# Patient Record
Sex: MALE | Race: WHITE | NOT HISPANIC OR LATINO | Employment: FULL TIME | URBAN - METROPOLITAN AREA
[De-identification: names, ages, dates, MRNs, and addresses within clinical notes are randomized per-mention and may not be internally consistent; named-entity substitution may affect disease eponyms.]

---

## 2017-03-30 ENCOUNTER — ALLSCRIPTS OFFICE VISIT (OUTPATIENT)
Dept: OTHER | Facility: OTHER | Age: 47
End: 2017-03-30

## 2018-01-15 NOTE — PROCEDURES
Procedures by David Argueta MD at 2016  11:41 AM      Author:  David Argueta MD Service:  Neurology Author Type:  Physician     Filed:  2016 11:52 AM Date of Service:  2016 11:41 AM Status:  Signed     :  David Argueta MD (Physician)            ELECTROENCEPHALOGRAM (EEG)      Patient Name:  Dominique Escalante  MRN: [id number]   :  1970 File #: [id number]   Age: 39 y o  Encounter #: [id number]   Date performed: 2016            Report date: 2016          Study type: Greater than 3 hour video EEG    ICD 10 diagnosis: Other Epilepsy G40 909    Start time: 07:50 End time: 10:48     -------------------------------------------------------------------------------------------------------------------   Patient History: This recording was observed in a 39 y o  male with history of epilepsy to  determine risk of recurrent seizure  Medications include: Oxcarbazepine    -------------------------------------------------------------------------------------------------------------------   Description of Procedure:  ·  32 channel digital recording with electrodes placed according to the International 10-20 system with additional  T1/T2 electrodes, EOG, EKG, and simultaneous  video  The recording was technically satisfactory  -------------------------------------------------------------------------------------------------------------------   EEG Description:    The recording was performed with the patient awake, drowsy, and asleep  He was fully oriented  During wakefulness, there were long runs of well regulated, low amplitude, posteriorly  dominant, symmetric 10 cps alpha rhythm that attenuated with eye opening  There were symmetric low amplitude, frontally dominant beta activities  There were intermittent, left temporal delta activities  With drowsiness, alpha activity attenuated and was replaced by diffusely distributed theta activities   With sleep, symmetric vertex sharp waves and poorly  formed sleep spindles were present      -------------------------------------------------------------------------------------------------------------------   Activation Procedures:  Hyperventilation was performed for 3-4  minutes and induced mild symmetric buildup  Stepped photic stimulation between 1-20 cps was performed and induced symmetric  photic driving  Other findings: The single lead ECG demonstrated a regular rhythm     -------------------------------------------------------------------------------------------------------------------   EEG Interpretation: This Routine EEG recorded during wakefulness, drowsiness, and sleep is abnormal  Intermittent,  subtle, left temporal delta waves raise the possibility of left temporal neuronal dysfunction  No inter-ictal epileptiform discharges were seen  Jackson Evans MD   [id number] [facility]               Received for:Scott Hall Meckel M D    Apr 7 2016 11:52AM [facility] Standard Time

## 2018-04-16 DIAGNOSIS — G40.919 INTRACTABLE EPILEPSY WITHOUT STATUS EPILEPTICUS, UNSPECIFIED EPILEPSY TYPE (HCC): Primary | ICD-10-CM

## 2018-04-16 RX ORDER — OXCARBAZEPINE 300 MG/1
600 TABLET, FILM COATED ORAL 2 TIMES DAILY
Qty: 360 TABLET | Refills: 3 | Status: SHIPPED | OUTPATIENT
Start: 2018-04-16 | End: 2018-07-13 | Stop reason: SDUPTHER

## 2018-04-16 RX ORDER — OXCARBAZEPINE 300 MG/1
2 TABLET, FILM COATED ORAL 2 TIMES DAILY
COMMUNITY
End: 2018-04-16 | Stop reason: SDUPTHER

## 2018-04-16 NOTE — TELEPHONE ENCOUNTER
Pt called to request refill of oxcarb and to also inform you that he has appt w/ you 4/26 and needs letter from you as part of his  sz waiver packet  States this has been done in the past but did not want to catch "you off guard at appt " Pt understands letter cannot be completed at time of visit

## 2018-04-20 ENCOUNTER — TELEPHONE (OUTPATIENT)
Dept: NEUROLOGY | Facility: CLINIC | Age: 48
End: 2018-04-20

## 2018-04-20 NOTE — TELEPHONE ENCOUNTER
Patient is asking for a letter to clear him to get his 's license back  He had to be episode free for 4 years and his 4 year maureen is the day of his appointment next week that was cancelled  Isn't able to get a sooner appointment than July  Patient states the waiver process can take a while after the letter is sent in  Patient is anxious to get back to work  Are you willing to write a letter of clearance? Please advise        [phone]

## 2018-04-20 NOTE — TELEPHONE ENCOUNTER
Yes     Please generate in a way that I can sign it electronically remotely  Am having trouble figuring out how to do that for a letter that Asher Rankin created for me for another pt

## 2018-04-23 ENCOUNTER — TELEPHONE (OUTPATIENT)
Dept: NEUROLOGY | Facility: CLINIC | Age: 48
End: 2018-04-23

## 2018-04-23 NOTE — TELEPHONE ENCOUNTER
Letter generated  With EPIC there is no way to electronically sign  It can be either sent as is or has to be manually signed  Please advise

## 2018-04-23 NOTE — TELEPHONE ENCOUNTER
Patient picked up letter from Dr Torsten Martinez showed current [place] DL   Scanned ID and the letter from Dr Torsten Martinez into patient chart in the Document section

## 2018-04-23 NOTE — TELEPHONE ENCOUNTER
I was going to say send it as is, but after reading it, I decided to edit it a bit to indicate that the patient had had only one seizure 4 years ago and has had none since  I also corrected my name, which is "[place]", not "Ric Doctor"  I did the editing by going into the communications section of his chart  I then tried to send the edited letter to the patient, but got an error window saying "the print option is not accessible"  I couldn't sign or approve the edited version of the letter, so I pended it  I don't know how to proceed from here  You can either send the edited version to the patient either electronically or print it and mail it, which ever is easier or even possible  If done today I can manually sign the printed letter (I'm at [facility]), but I will be on PTO for 2 weeks starting tomorrow

## 2018-07-13 ENCOUNTER — OFFICE VISIT (OUTPATIENT)
Dept: NEUROLOGY | Facility: CLINIC | Age: 48
End: 2018-07-13
Payer: COMMERCIAL

## 2018-07-13 VITALS
HEART RATE: 78 BPM | WEIGHT: 220.5 LBS | DIASTOLIC BLOOD PRESSURE: 76 MMHG | BODY MASS INDEX: 31.64 KG/M2 | SYSTOLIC BLOOD PRESSURE: 124 MMHG

## 2018-07-13 DIAGNOSIS — G40.209 COMPLEX PARTIAL SEIZURE EVOLVING TO GENERALIZED SEIZURE (HCC): Primary | ICD-10-CM

## 2018-07-13 PROCEDURE — 99213 OFFICE O/P EST LOW 20 MIN: CPT | Performed by: PSYCHIATRY & NEUROLOGY

## 2018-07-13 RX ORDER — OXCARBAZEPINE 300 MG/1
TABLET, FILM COATED ORAL
Qty: 360 TABLET | Refills: 3 | Status: SHIPPED | OUTPATIENT
Start: 2018-07-13 | End: 2019-03-29 | Stop reason: SDUPTHER

## 2018-07-13 NOTE — PROGRESS NOTES
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 [employer]  He has decided to continue to take oxcarbazepine to reduce chances of seizure recurrence though we had discussed in past if at any point he wanted to consider coming off his oxcarbazepine, a repeat EEG will be done to see if there are any remaining abnormalities and if not, he would have at least a 90% chance of coming off his medication without seizures recurring  Patient is still curious what caused his seizure  MRI was normal and he asked if a repeat MRI could catch something that was not evident on that first MRI  There is some basis for that since his last EEG showed some left temporal slowing  We will consider the possibility of repeat MRI when I see him back next year  The use of oxcarbazepine does not have long term side effects  I did discuss with him that it can cause hyponatremia which can be problematic in the summer when the heat causes more perspiration and sodium loss  He assured me he drinks more Gatorade and not water and eats salt liberally  As far as determining the cause for his seizure, patient told me 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 and he has reported to me in the past that 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 by the baseball  I will see him back in one year for 30 minutes      Subjective:      HPI    16 month follow-up for a 52year old right-handed male who had his one and only seizure on 4/25/2014  When I saw the patient on 4/28, we were going to discuss possibly taking him off seizure medication, however his EEG showed left temporal slowing, which, although not epileptogenic, suggested a persistent left temporal abnormality, and a somewhat higher risk of seizure recurrence should his medication be discontinued  Before his seizure the patient was a  for [employer]  He was able to keep his job, but was not allowed to drive  The 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, and, therefore, has chosen to remain on seizure medication as to not risk seizure recurrence  When I saw him on 3/30/17, he was doing well and had not had any seizures  He also denied any side effects from his oxcarbazepine  Last seen on 3/30/17    INTERVAL HISTORY:    Current AED:  Oxcarbazepine 300 mg tablet, 2 tabs BID    Patient reports that he has not had any seizures since his last visit  Denies any side effects on his medications  He asked about possible side effects on oxcarbazepine and I told him the most common is hyponatremia but he reports that he has not experienced any dizziness and he eats enough salt and limits his water intake  At this time, he would like to remain on his medications and he asked if he would need periodic MRIs or EEGs  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  Every two years he will need his card renewed  The following portions of the patient's history were reviewed and updated as appropriate: allergies, current medications, past family history, past medical history, past social history, past surgical history and problem list          Objective:    Blood pressure 124/76, pulse 78, weight 100 kg (220 lb 8 oz)  Physical Exam   Constitutional: He appears well-developed and well-nourished  HENT:   Head: Normocephalic and atraumatic  Eyes: EOM are normal  Pupils are equal, round, and reactive to light     Neck: Neck supple  Cardiovascular: Normal rate and regular rhythm  Pulmonary/Chest: Effort normal    Musculoskeletal: Normal range of motion  Neurological: He is alert  He has normal strength  Gait and coordination normal    Psychiatric: His speech is normal    Vitals reviewed  Neurological Exam    Mental Status  The patient is alert and oriented to person, place, time, and situation  His recent and remote memory are normal  His speech is normal  His language is fluent with no aphasia  He has normal attention span and concentration  He has a normal fund of knowledge  Cranial Nerves    CN II: The patient's visual acuity and visual fields are normal   CN III, IV, VI: The patient's pupils are equally round and reactive to light and ocular movements are normal   CN V: The patient has normal facial sensation  CN VII:  The patient has symmetric facial movement  CN VIII:  The patient's hearing is normal   CN IX, X: The patient has symmetric palate movement and normal gag reflex  CN XI: The patient's shoulder shrug strength is normal   CN XII: The patient's tongue is midline without atrophy or fasciculations  Motor   His strength is 5/5 throughout all four extremities  Gait and Coordination  The patient has normal gait and station and normal casual, toe, heel, and tandem gait  He has normal coordination bilaterally  ROS:    Review of Systems   Constitutional: Negative  HENT: Negative  Eyes: Negative  Respiratory: Negative  Cardiovascular: Negative  Gastrointestinal: Negative  Endocrine: Negative  Genitourinary: Negative  Musculoskeletal: Negative  Skin: Negative  Allergic/Immunologic: Negative  Neurological: Negative  Hematological: Negative  Psychiatric/Behavioral: Negative  Counseling Documentation:  Time in: 9:05, Time out: 9:20  Total time encounter was 15 minutes and 13 minutes were spent counseling the patient        Attestation:   By signing my name below, I, Rhea Velasco, attest that this documentation has been prepared under the direction and in the presence of Claude Creed, MD  Electronically Signed: Komal Rubio  04/10/19     I, Claude Creed, personally performed the services described in this documentation  All medical record entries made by the ilyaibrory were at my direction and in my presence  I have reviewed the chart and discharge instructions and agree that the record reflects my personal performance and is accurate and complete    Claude Creed, MD  04/10/19

## 2018-07-13 NOTE — LETTER
July 16, 2018     DO Jennifer So    Patient: Azeb Tsang   YOB: 1970   Date of Visit: 7/13/2018       Dear Dr John Camejo: Thank you for referring Azeb Tsang to me for evaluation  Below are my notes for this consultation  If you have questions, please do not hesitate to call me  I look forward to following your patient along with you  Sincerely,        Rusty Singh MD        CC: No Recipients  Rusty Singh MD  7/16/2018  3:48 AM  Signed  Patient ID: Azeb Tsang is a 52 y o  male  Assessment/Plan:       Problem List Items Addressed This Visit     None      Visit Diagnoses     Intractable epilepsy without status epilepticus, unspecified epilepsy type ([address] )        Relevant Medications    OXcarbazepine (TRILEPTAL) 300 mg tablet            Discussion Summary:    Patient continues to do well  He has been seizure free for 4 years now and I was pleased to hear the restrictions for restoration of a 's license have been less restrictive  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 [employer]  He has decided to continue to take oxcarbazepine to reduce chances of seizure recurrence though we had discussed in past if at any point he wanted to consider coming off his oxcarbazepine, a repeat EEG will be done to see if there are any remaining abnormalities and if not, he would have at least a 90% chance of coming off his medication without seizures recurring  Patient is still curious what caused his seizure  MRI was normal and he asked if a repeat MRI could catch something that was not evident on that first MRI  There is some basis for that since his last EEG showed some left temporal slowing  We will consider the possibility of repeat MRI when I see him back next year  The use of oxcarbazepine does not have long term side effects   I did discuss with him that it can cause hyponatremia which can be problematic in the summer when the heat causes more perspiration and sodium loss  He assured me he drinks more Gatorade and not water and eats salt liberally  As far as determining the cause for his seizure, patient told me 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 and he has reported to me in the past that 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 by the baseball  I will see him back in one year for 30 minutes  Subjective:      HPI    16 month follow-up for a 52year old right-handed male who had his one and only seizure on 4/25/2014  When I saw the patient on 4/28, we were going to discuss possibly taking him off seizure medication, however his EEG showed left temporal slowing, which, although not epileptogenic, suggested a persistent left temporal abnormality, and a somewhat higher risk of seizure recurrence should his medication be discontinued  Before his seizure the patient was a  for [employer]  He was able to keep his job, but was not allowed to drive  The 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, and, therefore, has chosen to remain on seizure medication as to not risk seizure recurrence  When I saw him on 3/30/17, he was doing well and had not had any seizures  He also denied any side effects from his oxcarbazepine  Last seen on 3/30/17    INTERVAL HISTORY:    Current AED:  Oxcarbazepine 300 mg tablet, 2 tabs BID    Patient reports that he has not had any seizures since his last visit  Denies any side effects on his medications  He asked about possible side effects on oxcarbazepine and I told him the most common is hyponatremia but he reports that he has not experienced any dizziness and he eats enough salt and limits his water intake  At this time, he would like to remain on his medications and he asked if he would need periodic MRIs or EEGs       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  Every two years he will need his card renewed  The following portions of the patient's history were reviewed and updated as appropriate: allergies, current medications, past family history, past medical history, past social history, past surgical history and problem list          Objective:    Blood pressure 124/76, pulse 78, weight 100 kg (220 lb 8 oz)  Physical Exam   Constitutional: He appears well-developed and well-nourished  HENT:   Head: Normocephalic and atraumatic  Eyes: EOM are normal  Pupils are equal, round, and reactive to light  Neck: Neck supple  Cardiovascular: Normal rate and regular rhythm  Pulmonary/Chest: Effort normal    Musculoskeletal: Normal range of motion  Neurological: He is alert  He has normal strength  Gait and coordination normal    Psychiatric: His speech is normal    Vitals reviewed  Neurological Exam    Mental Status  The patient is alert and oriented to person, place, time, and situation  His recent and remote memory are normal  His speech is normal  His language is fluent with no aphasia  He has normal attention span and concentration  He has a normal fund of knowledge  Cranial Nerves    CN II: The patient's visual acuity and visual fields are normal   CN III, IV, VI: The patient's pupils are equally round and reactive to light and ocular movements are normal   CN V: The patient has normal facial sensation  CN VII:  The patient has symmetric facial movement  CN VIII:  The patient's hearing is normal   CN IX, X: The patient has symmetric palate movement and normal gag reflex  CN XI: The patient's shoulder shrug strength is normal   CN XII: The patient's tongue is midline without atrophy or fasciculations  Motor   His strength is 5/5 throughout all four extremities  Gait and Coordination  The patient has normal gait and station and normal casual, toe, heel, and tandem gait   He has normal coordination bilaterally  ROS:    Review of Systems   Constitutional: Negative  HENT: Negative  Eyes: Negative  Respiratory: Negative  Cardiovascular: Negative  Gastrointestinal: Negative  Endocrine: Negative  Genitourinary: Negative  Musculoskeletal: Negative  Skin: Negative  Allergic/Immunologic: Negative  Neurological: Negative  Hematological: Negative  Psychiatric/Behavioral: Negative  Counseling Documentation:  Time in: 9:05, Time out: 9:20  Total time encounter was 15 minutes and 13 minutes were spent counseling the patient  Attestation:   By signing my name below, Ric Solano, attest that this documentation has been prepared under the direction and in the presence of Claude Creed, MD  Electronically Signed: Komal Rubio  07/13/18     I, Claude Creed, personally performed the services described in this documentation  All medical record entries made by the ilyaibrory were at my direction and in my presence  I have reviewed the chart and discharge instructions and agree that the record reflects my personal performance and is accurate and complete    Claude Creed, MD  07/13/18

## 2018-07-16 RX ORDER — OMEGA-3 FATTY ACIDS/FISH OIL 300-1000MG
1 CAPSULE ORAL
COMMUNITY

## 2019-03-12 ENCOUNTER — DOCUMENTATION (OUTPATIENT)
Dept: NEUROLOGY | Facility: CLINIC | Age: 49
End: 2019-03-12

## 2019-03-29 DIAGNOSIS — G40.919 INTRACTABLE EPILEPSY WITHOUT STATUS EPILEPTICUS, UNSPECIFIED EPILEPSY TYPE (HCC): ICD-10-CM

## 2019-03-29 RX ORDER — OXCARBAZEPINE 300 MG/1
TABLET, FILM COATED ORAL
Qty: 360 TABLET | Refills: 0 | Status: SHIPPED | OUTPATIENT
Start: 2019-03-29 | End: 2019-05-10 | Stop reason: SDUPTHER

## 2019-03-29 NOTE — TELEPHONE ENCOUNTER
Patient has not been seen since 3/17/2017  I will give enough refills to last 3 months but pls call and make him an appointment to see me by then

## 2019-03-29 NOTE — TELEPHONE ENCOUNTER
Patient last seen 7/13/2018 and Dr Zoë Neal advised patient have a f/u one year later  Patient is scheduled  7/5/19

## 2019-04-08 ENCOUNTER — TELEPHONE (OUTPATIENT)
Dept: NEUROLOGY | Facility: CLINIC | Age: 49
End: 2019-04-08

## 2019-04-08 ENCOUNTER — PATIENT MESSAGE (OUTPATIENT)
Dept: NEUROLOGY | Facility: CLINIC | Age: 49
End: 2019-04-08

## 2019-04-10 ENCOUNTER — TELEPHONE (OUTPATIENT)
Dept: NEUROLOGY | Facility: CLINIC | Age: 49
End: 2019-04-10

## 2019-04-18 ENCOUNTER — TELEPHONE (OUTPATIENT)
Dept: NEUROLOGY | Facility: CLINIC | Age: 49
End: 2019-04-18

## 2019-05-10 ENCOUNTER — OFFICE VISIT (OUTPATIENT)
Dept: NEUROLOGY | Facility: CLINIC | Age: 49
End: 2019-05-10
Payer: COMMERCIAL

## 2019-05-10 VITALS
HEART RATE: 66 BPM | DIASTOLIC BLOOD PRESSURE: 70 MMHG | BODY MASS INDEX: 31.28 KG/M2 | SYSTOLIC BLOOD PRESSURE: 104 MMHG | WEIGHT: 218 LBS

## 2019-05-10 DIAGNOSIS — R53.83 TIREDNESS: ICD-10-CM

## 2019-05-10 DIAGNOSIS — G40.919 INTRACTABLE EPILEPSY WITHOUT STATUS EPILEPTICUS, UNSPECIFIED EPILEPSY TYPE (HCC): Primary | ICD-10-CM

## 2019-05-10 PROCEDURE — 99213 OFFICE O/P EST LOW 20 MIN: CPT | Performed by: PSYCHIATRY & NEUROLOGY

## 2019-05-13 RX ORDER — OXCARBAZEPINE 300 MG/1
600 TABLET, FILM COATED ORAL 2 TIMES DAILY
Qty: 360 TABLET | Refills: 3 | Status: SHIPPED | OUTPATIENT
Start: 2019-05-13 | End: 2020-02-21 | Stop reason: SDUPTHER

## 2020-02-21 ENCOUNTER — OFFICE VISIT (OUTPATIENT)
Dept: NEUROLOGY | Facility: CLINIC | Age: 50
End: 2020-02-21
Payer: COMMERCIAL

## 2020-02-21 VITALS
SYSTOLIC BLOOD PRESSURE: 112 MMHG | WEIGHT: 220 LBS | HEART RATE: 66 BPM | DIASTOLIC BLOOD PRESSURE: 78 MMHG | BODY MASS INDEX: 31.57 KG/M2

## 2020-02-21 DIAGNOSIS — G40.919 INTRACTABLE EPILEPSY WITHOUT STATUS EPILEPTICUS, UNSPECIFIED EPILEPSY TYPE (HCC): ICD-10-CM

## 2020-02-21 PROCEDURE — 99212 OFFICE O/P EST SF 10 MIN: CPT | Performed by: PSYCHIATRY & NEUROLOGY

## 2020-02-21 RX ORDER — OXCARBAZEPINE 300 MG/1
600 TABLET, FILM COATED ORAL 2 TIMES DAILY
Qty: 360 TABLET | Refills: 3 | Status: SHIPPED | OUTPATIENT
Start: 2020-02-21 | End: 2020-10-23 | Stop reason: SDUPTHER

## 2020-02-21 NOTE — LETTER
2020     Patient: Hua Wray   YOB: 1970   Date of Visit: 2020       To Whom it May Concern,     Hua Wray  1970 is a patient [facility] the [facility]  Please be advised he is compliant with his treatment plan and has remained seizure free since 2014   Should you require any further information please contact our office at [phone]      Sincerely,          Dr Berna Henderson MD

## 2020-02-21 NOTE — PROGRESS NOTES
Patient ID: Nannette Martinez is a 52 y o  male  Assessment/Plan:    Chiara Lanier was seen today for seizures  Diagnoses and all orders for this visit:    Intractable epilepsy without status epilepticus, unspecified epilepsy type ([facility] [address] )  -     OXcarbazepine (TRILEPTAL) 300 mg tablet; Take 2 tablets (600 mg total) by mouth 2 (two) times a day 2 tabs twice a day        Discussion Summary:  Mr Thomas First remains seizure free  He only had one seizure that occurred in 2014, etiology of which is unclear  He has chosen to remain on oxcarbazepine life-long since its not causing any side effects, and he wants to make sure to avoid any chance of having a seizure so that 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  I offered to have him undergo a 3-T MRI to look for the etiology of his seizure, but since it would not  since hes already decided to stay on oxcarbazepine life-long we decided that it was not necessary to have the test done  I mentioned that oxcarbazepine can cause hyponatremia, but he has no symptoms of significant hyponatremia  Ill see him back in a year for 30 minutes  Subjective:    HPI    9 month follow-up for a 52year old right-handed male who had his one and only seizure on 4/25/2014  When I saw the patient on 4/28, we were going to discuss possibly taking him off seizure medication, however his EEG in 2016 showed left temporal slowing, which, although not epileptogenic, suggested a persistent left temporal abnormality, and a somewhat higher risk of seizure recurrence should his medication be discontinued  Before his seizure the patient was a  for [employer]  He was able to keep his job, but was not allowed to drive  The 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, and, therefore, has chosen to remain on seizure medication as to not risk seizure recurrence  When I saw him on 3/30/17, he was doing well and had not had any seizures   He also denied any side effects from his oxcarbazepine  When I saw the patient on 7/13/18, he remained seizure free and was about to have his driving restrictions at [address] lifted  Patient expressed interest in staying on his oxcarbazepine although a repeat EEG needed to see if there were any remaining abnormalities before coming off his medication was discussed  He was curious as to the cause of the seizure as his MRI had been normal, although he did report a history of head trauma in high school sports at that visit, and his last EEG had showed some left temporal slowing so we considered having the patient undergo an MRI after his next visit  The long term side effect of hyponatremia was discussed with the patient and he assured me that he drinks Gatorade not water and uses salt liberally  When I saw the patient on 5/10/19, he reported no seizures and indicated that he was fine with remaining on Tegretol  3-T MRI was discussed and planned following his follow up in a year  Last seen on 5/10/19  INTERVAL HISTORY:    Patient has not had any recurrence of seizures  Current AED:  Oxcarbazepine 300 mg tablets, 2 tablets twice a day    Patient denies any side effects from his medications  Patient states he does have some tiredness at night but says that he works hard all day  Patient plans on staying on his medications life-long  He may wait until he retires to see if he wants to go off his seizure medication  He denies any other medical issues  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 [employer]  The following portions of the patient's history were reviewed and updated as appropriate: allergies, current medications, past family history, past medical history, past social history, past surgical history and problem list          Objective:    Blood pressure 112/78, pulse 66, weight 99 8 kg (220 lb)  Physical Exam   Constitutional: He is oriented to person, place, and time  He appears well-developed and well-nourished  HENT:   Head: Normocephalic and atraumatic  Right Ear: External ear normal    Left Ear: External ear normal    Eyes: Pupils are equal, round, and reactive to light  EOM and lids are normal    Neck: Neck supple  Cardiovascular: Normal rate and regular rhythm  Pulmonary/Chest: Effort normal    Musculoskeletal: Normal range of motion  Neurological: He is alert and oriented to person, place, and time  He has normal strength and normal reflexes  Coordination normal    Skin: Skin is warm and dry  Psychiatric: He has a normal mood and affect  His speech is normal    Vitals reviewed  Neurological Exam  Mental Status  Awake and alert  Oriented to person, place and time  Speech is normal  Language is fluent with no aphasia  Attention and concentration are normal     Cranial Nerves  CN II: Visual acuity is normal  Visual fields full to confrontation  CN III, IV, VI: Extraocular movements intact bilaterally  Extraocular movements intact bilaterally  Normal lids and orbits bilaterally  Pupils equal round and reactive to light bilaterally  CN V: Facial sensation is normal   CN VII: Full and symmetric facial movement  CN VIII: Hearing is normal   CN IX, X: Palate elevates symmetrically  Normal gag reflex  CN XI: Shoulder shrug strength is normal   CN XII: Tongue midline without atrophy or fasciculations  Motor   Strength is 5/5 throughout all four extremities  Sensory  Sensation is intact to light touch, pinprick, vibration and proprioception in all four extremities  Reflexes  Deep tendon reflexes are 2+ and symmetric in all four extremities with downgoing toes bilaterally  Coordination  Finger-to-nose, rapid alternating movements and heel-to-shin normal bilaterally without dysmetria  Gait  Normal casual, toe, heel and tandem gait  Romberg is absent  ROS:    Review of Systems   Constitutional: Negative  Negative for appetite change and fever  HENT: Negative    Negative for hearing loss, tinnitus, trouble swallowing and voice change  Eyes: Negative  Negative for photophobia and pain  Respiratory: Negative  Negative for shortness of breath  Cardiovascular: Negative  Negative for palpitations  Gastrointestinal: Negative  Negative for nausea and vomiting  Endocrine: Negative  Negative for cold intolerance and heat intolerance  Genitourinary: Negative  Negative for dysuria, frequency and urgency  Musculoskeletal: Negative  Negative for myalgias and neck pain  Skin: Negative  Negative for rash  Neurological: Negative  Negative for dizziness, tremors, seizures, syncope, facial asymmetry, speech difficulty, weakness, light-headedness, numbness and headaches  Hematological: Negative  Does not bruise/bleed easily  Psychiatric/Behavioral: Negative  Negative for confusion, hallucinations and sleep disturbance  Counseling Attestation:  Time in: 10:40, Time out: 10:50  Total time encounter was 10 minutes and 6 minutes were spent counseling the patient      Attestation:   By signing my name below, Denise Triston, attest that this documentation has been prepared under the direction and in the presence of Akanksha Kline MD  Electronically Signed: Komal Cha  02/21/2020     I, Akanksha Kline, personally performed the services described in this documentation  All medical record entries made by the scribe were at my direction and in my presence   I have reviewed the chart and discharge instructions and agree that the record reflects my personal performance and is accurate and complete  Miguel Arnold MD  02/21/2020

## 2020-02-21 NOTE — PATIENT INSTRUCTIONS
1  Continue to take oxcarbazepine 300 mg tablets, 2 tablets twice a day  2  Follow up with me in one year and call with any questions or concerns

## 2020-10-23 DIAGNOSIS — G40.919 INTRACTABLE EPILEPSY WITHOUT STATUS EPILEPTICUS, UNSPECIFIED EPILEPSY TYPE (HCC): ICD-10-CM

## 2020-10-26 RX ORDER — OXCARBAZEPINE 300 MG/1
600 TABLET, FILM COATED ORAL 2 TIMES DAILY
Qty: 360 TABLET | Refills: 3 | Status: SHIPPED | OUTPATIENT
Start: 2020-10-26 | End: 2021-05-24 | Stop reason: SDUPTHER

## 2021-05-21 ENCOUNTER — TELEPHONE (OUTPATIENT)
Dept: NEUROLOGY | Facility: CLINIC | Age: 51
End: 2021-05-21

## 2021-05-24 ENCOUNTER — OFFICE VISIT (OUTPATIENT)
Dept: NEUROLOGY | Facility: CLINIC | Age: 51
End: 2021-05-24
Payer: COMMERCIAL

## 2021-05-24 VITALS
SYSTOLIC BLOOD PRESSURE: 143 MMHG | DIASTOLIC BLOOD PRESSURE: 75 MMHG | WEIGHT: 221 LBS | HEIGHT: 70 IN | BODY MASS INDEX: 31.64 KG/M2 | HEART RATE: 75 BPM

## 2021-05-24 DIAGNOSIS — R56.9 SINGLE SEIZURE (HCC): ICD-10-CM

## 2021-05-24 DIAGNOSIS — G40.919 INTRACTABLE EPILEPSY WITHOUT STATUS EPILEPTICUS, UNSPECIFIED EPILEPSY TYPE (HCC): ICD-10-CM

## 2021-05-24 PROCEDURE — 99214 OFFICE O/P EST MOD 30 MIN: CPT | Performed by: PSYCHIATRY & NEUROLOGY

## 2021-05-24 RX ORDER — OXCARBAZEPINE 600 MG/1
600 TABLET, FILM COATED ORAL EVERY 12 HOURS SCHEDULED
Qty: 180 TABLET | Refills: 3 | Status: SHIPPED | OUTPATIENT
Start: 2021-05-24 | End: 2021-05-25 | Stop reason: SDUPTHER

## 2021-05-24 NOTE — LETTER
2021     To whom it may concern,    Eva Kothari ( 1970) is under my neurologic care and I saw him in the office today  He has been compliant with medications and stable without any additional seizures since his single seizure in   He is cleared to continue to drive from a neurologic perspective       Sincerely,       Justin Torres MD

## 2021-05-24 NOTE — ASSESSMENT & PLAN NOTE
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

## 2021-05-24 NOTE — PATIENT INSTRUCTIONS
-- Continue to take Oxcarbazepine 600 mg twice a day (I will change the tablet to be a 600 mg tab, so you will take one tab twice a day when you get the new prescription)  -- Please get some bloodwork before your next appointment

## 2021-05-25 DIAGNOSIS — G40.919 INTRACTABLE EPILEPSY WITHOUT STATUS EPILEPTICUS, UNSPECIFIED EPILEPSY TYPE (HCC): ICD-10-CM

## 2021-05-25 RX ORDER — OXCARBAZEPINE 600 MG/1
600 TABLET, FILM COATED ORAL EVERY 12 HOURS SCHEDULED
Qty: 180 TABLET | Refills: 3 | Status: SHIPPED | OUTPATIENT
Start: 2021-05-25 | End: 2022-04-18 | Stop reason: SDUPTHER

## 2021-05-25 NOTE — TELEPHONE ENCOUNTER
Fax received from pharmacy requesting clarification on instructions  Script currently reads:  Sig: Take 1 tablet (600 mg total) by mouth every 12 (twelve) hours 2 tabs twice a day            Per office note, patient should be taking 600mg twice per day  Script updated  Please sign off

## 2021-05-27 NOTE — TELEPHONE ENCOUNTER
Received another clarification fax  Called pharmacy and clarified instructions  They will process script and get to patient  Nothing further needed

## 2021-05-28 NOTE — TELEPHONE ENCOUNTER
Patient calling to say that [facility] needed clarification regarding his oxcarbazepine  Called [facility] and was told that the medication was successfully processed and shipping to patient today  Called and made patient aware  Looks like a script was also sent to [facility] [place]  Attempted to call 3 times but was unsuccessful getting in contact with pharmacy

## 2021-11-13 ENCOUNTER — OFFICE VISIT (OUTPATIENT)
Dept: URGENT CARE | Facility: MEDICAL CENTER | Age: 51
End: 2021-11-13
Payer: COMMERCIAL

## 2021-11-13 VITALS
BODY MASS INDEX: 31.5 KG/M2 | HEIGHT: 70 IN | RESPIRATION RATE: 18 BRPM | HEART RATE: 97 BPM | TEMPERATURE: 97.3 F | OXYGEN SATURATION: 100 % | WEIGHT: 220 LBS

## 2021-11-13 DIAGNOSIS — Z20.822 EXPOSURE TO COVID-19 VIRUS: ICD-10-CM

## 2021-11-13 DIAGNOSIS — R09.81 NASAL CONGESTION: Primary | ICD-10-CM

## 2021-11-13 PROCEDURE — U0003 INFECTIOUS AGENT DETECTION BY NUCLEIC ACID (DNA OR RNA); SEVERE ACUTE RESPIRATORY SYNDROME CORONAVIRUS 2 (SARS-COV-2) (CORONAVIRUS DISEASE [COVID-19]), AMPLIFIED PROBE TECHNIQUE, MAKING USE OF HIGH THROUGHPUT TECHNOLOGIES AS DESCRIBED BY CMS-2020-01-R: HCPCS | Performed by: PHYSICIAN ASSISTANT

## 2021-11-13 PROCEDURE — U0005 INFEC AGEN DETEC AMPLI PROBE: HCPCS | Performed by: PHYSICIAN ASSISTANT

## 2021-11-13 PROCEDURE — 99213 OFFICE O/P EST LOW 20 MIN: CPT | Performed by: PHYSICIAN ASSISTANT

## 2021-11-15 LAB — SARS-COV-2 RNA RESP QL NAA+PROBE: POSITIVE

## 2021-11-16 ENCOUNTER — TELEPHONE (OUTPATIENT)
Dept: URGENT CARE | Facility: MEDICAL CENTER | Age: 51
End: 2021-11-16

## 2022-04-18 ENCOUNTER — OFFICE VISIT (OUTPATIENT)
Dept: NEUROLOGY | Facility: CLINIC | Age: 52
End: 2022-04-18
Payer: COMMERCIAL

## 2022-04-18 VITALS
WEIGHT: 220 LBS | SYSTOLIC BLOOD PRESSURE: 145 MMHG | DIASTOLIC BLOOD PRESSURE: 83 MMHG | HEART RATE: 75 BPM | BODY MASS INDEX: 31.5 KG/M2 | HEIGHT: 70 IN

## 2022-04-18 DIAGNOSIS — R56.9 SINGLE SEIZURE (HCC): Primary | ICD-10-CM

## 2022-04-18 DIAGNOSIS — G40.919 INTRACTABLE EPILEPSY WITHOUT STATUS EPILEPTICUS, UNSPECIFIED EPILEPSY TYPE (HCC): ICD-10-CM

## 2022-04-18 PROCEDURE — 99214 OFFICE O/P EST MOD 30 MIN: CPT | Performed by: PSYCHIATRY & NEUROLOGY

## 2022-04-18 RX ORDER — OXCARBAZEPINE 600 MG/1
600 TABLET, FILM COATED ORAL EVERY 12 HOURS SCHEDULED
Qty: 180 TABLET | Refills: 3 | Status: SHIPPED | OUTPATIENT
Start: 2022-04-18

## 2022-04-18 NOTE — ASSESSMENT & PLAN NOTE
Assessment:  rKisti Werner is a 46 y o  male is seen today in the Epilepsy clinic as a follow for seizures  Patient was last seen on 5/24/2021 and since his last appointment is overall doing well  To review patient continues to be seizure free since his single seizure in 2014  He is maintained on Oxcarbazepine 600 mg BID  Continues to tolerate oxcarbazepine well without any significant side effects  Neurological exam continues to remain stable today   Plan as below:    Plan:   · Continue Oxcarbazepine 600 mg BID   · Patient continues to be seizure free  · Driving Status: Currently driving, works for [employer]   · Letter provided   · All questions addressed  · Follow up in 1 year or sooner if needed

## 2022-04-18 NOTE — PROGRESS NOTES
Patient ID: Hua Wray is a 46 y o  male with seizure disorder, who is returning to Neurology office for follow up of his seizure  Assessment/Plan:    Single seizure [facility])  Assessment:  Hua Wray is a 46 y o  male is seen today in the Epilepsy clinic as a follow for seizures  Patient was last seen on 5/24/2021 and since his last appointment is overall doing well  To review patient continues to be seizure free since his single seizure in 2014  He is maintained on Oxcarbazepine 600 mg BID  Continues to tolerate oxcarbazepine well without any significant side effects  Neurological exam continues to remain stable today  Plan as below:    Plan:   · Continue Oxcarbazepine 600 mg BID   · Patient continues to be seizure free  · Driving Status: Currently driving, works for [employer]   · Letter provided   · All questions addressed  · Follow up in 1 year or sooner if needed          He will Return in about 1 year (around 4/18/2023) for With Dr Deon Do  Subjective:    HPI   Hua Wray is a 46 y o  male is seen today in follow up in epilepsy clinic  Patient was last seen in office on 5/24/2021  During that visit, he was maintained on Oxcarbazepine 600 mg BID  Since his last visit, he continues to do well  He denies any further seizures or seizure like episodes  His last seizure was in 2014 and he has only had that one lifetime seizure  He continues to work for [employer]  Reports compliance with his medications, no side effects  Continue to follow with his PCP  No recent hospitalizations or changes to his medical history  Current seizure medications:  1  Oxcarbazepine 600 mg BID   Other medications as per Epic  Prior Seizure Medications: Keppra (Se: Mood issues)      Objective:    Blood pressure 145/83, pulse 75, height 5' 10" (1 778 m), weight 99 8 kg (220 lb)  Physical Exam  Vitals and nursing note reviewed  Constitutional:       Appearance: Normal appearance     HENT:      Head: Normocephalic and atraumatic  Eyes:      General: Lids are normal       Extraocular Movements: Extraocular movements intact  Pupils: Pupils are equal, round, and reactive to light  Pulmonary:      Effort: No respiratory distress  Musculoskeletal:      Cervical back: Normal range of motion  Skin:     General: Skin is warm and dry  Neurological:      General: No focal deficit present  Mental Status: He is alert  Coordination: Romberg sign negative  Psychiatric:         Speech: Speech normal          Neurological Exam  Mental Status  Alert  Oriented to person, place and time  Speech is normal  Language is fluent with no aphasia  Cranial Nerves  CN II: Visual fields full to confrontation  CN III, IV, VI: Extraocular movements intact bilaterally  Normal lids and orbits bilaterally  Pupils equal round and reactive to light bilaterally  CN V: Facial sensation is normal   CN VII: Full and symmetric facial movement  CN VIII: Hearing is normal   CN XI: Shoulder shrug strength is normal   CN XII: Tongue midline without atrophy or fasciculations  Motor  Normal muscle bulk throughout  No fasciculations present  Normal muscle tone  Strength is 5/5 in all four extremities except as noted  Sensory  Light touch is normal in upper and lower extremities  Reflexes  Deep tendon reflexes are 2+ and symmetric except as noted  Coordination  Right: Finger-to-nose normal   Left: Finger-to-nose normal     Gait  Casual gait is normal including stance, stride, and arm swing  Romberg is absent  ROS:  I reviewed the below ROS and what is mentioned in HPI, the remainder of ROS was negative  Review of Systems   Constitutional: Negative  Negative for appetite change and fever  HENT: Negative  Negative for hearing loss, tinnitus, trouble swallowing and voice change  Eyes: Negative  Negative for photophobia and pain  Respiratory: Negative  Negative for shortness of breath  Cardiovascular: Negative  Negative for palpitations  Gastrointestinal: Negative  Negative for nausea and vomiting  Endocrine: Negative  Negative for cold intolerance  Genitourinary: Negative  Negative for dysuria, frequency and urgency  Musculoskeletal: Negative  Negative for myalgias and neck pain  Skin: Negative  Negative for rash  Neurological: Negative  Negative for dizziness, tremors, seizures, syncope, facial asymmetry, speech difficulty, weakness, light-headedness, numbness and headaches  Hematological: Negative  Does not bruise/bleed easily  Psychiatric/Behavioral: Negative  Negative for confusion, hallucinations and sleep disturbance  All other systems reviewed and are negative  I personally reviewed the ROS that was entered by the medical assistant    Voice recognition software was used in the generation of this note  There may be unintentional errors including grammatical errors, spelling errors, or pronoun errors

## 2022-04-18 NOTE — LETTER
2022     To whom it may concern,     Kristi Werner ( 1970) continues to be under my neurologic care and I saw him in the office today  He has been compliant with medications and stable without any additional seizures since his single seizure in   He is cleared to continue to drive from a neurologic perspective       Sincerely,      Jens Rm MD

## 2022-08-30 ENCOUNTER — TELEPHONE (OUTPATIENT)
Dept: NEUROLOGY | Facility: CLINIC | Age: 52
End: 2022-08-30

## 2022-08-30 NOTE — TELEPHONE ENCOUNTER
[facility] for patient to call and schedule his one year follow-up with Dr Izabella Martinez in April 2023  Provided patient with the main number to call

## 2022-12-12 ENCOUNTER — TELEPHONE (OUTPATIENT)
Dept: NEUROLOGY | Facility: CLINIC | Age: 52
End: 2022-12-12

## 2022-12-12 NOTE — TELEPHONE ENCOUNTER
Received VM transcription:    Hi, my name is [employer]  I am a patient of Dr Ziggy Mckinley  I'm being seen by him for a seizure I had, I think, 9 or 10 years ago  Nothing since  However, I was liking to try and get an appointment as soon as possible because my father has frontal lobe dementia and I am 46years old and I'm starting to have issues with anger and control of doing things, not physically, it's hard to explain  I keep doing like wrong things  Like when I know it's a wrong thing to do, but I'm fine function wise, it's just my mind is not doing things that I would like it to do  So I wanted to try and schedule an appointment for evaluation as soon as possible  I know it's hard to get in there  My next appointment for my other thing isn't until April  So please give me a call back  [phone]  Thanks bye

## 2022-12-13 NOTE — TELEPHONE ENCOUNTER
Dr Jonas Salamanca is out of the office this week  The patient's concern is not urgent to require an ASAP appointment  However, he can reschedule his appointment for a sooner appointment depending on what is next available  Rayma please look for a next available follow-up appointment on Dr Alfredo Shaffer schedule

## 2022-12-21 NOTE — TELEPHONE ENCOUNTER
LVM offering earlier appointment with Dr Jaky Jaime on 1/5 at [address] [facility] in [place]  I provided direct call back number for scheduling

## 2023-01-05 ENCOUNTER — OFFICE VISIT (OUTPATIENT)
Dept: NEUROLOGY | Facility: CLINIC | Age: 53
End: 2023-01-05

## 2023-01-05 VITALS
SYSTOLIC BLOOD PRESSURE: 130 MMHG | DIASTOLIC BLOOD PRESSURE: 69 MMHG | BODY MASS INDEX: 31.07 KG/M2 | HEART RATE: 73 BPM | HEIGHT: 70 IN | WEIGHT: 217 LBS

## 2023-01-05 DIAGNOSIS — R46.89 COGNITIVE AND BEHAVIORAL CHANGES: ICD-10-CM

## 2023-01-05 DIAGNOSIS — G40.919 INTRACTABLE EPILEPSY WITHOUT STATUS EPILEPTICUS, UNSPECIFIED EPILEPSY TYPE (HCC): ICD-10-CM

## 2023-01-05 DIAGNOSIS — R41.89 COGNITIVE AND BEHAVIORAL CHANGES: ICD-10-CM

## 2023-01-05 DIAGNOSIS — R56.9 SINGLE SEIZURE (HCC): Primary | ICD-10-CM

## 2023-01-05 RX ORDER — OXCARBAZEPINE 600 MG/1
600 TABLET, FILM COATED ORAL EVERY 12 HOURS SCHEDULED
Qty: 180 TABLET | Refills: 3 | Status: SHIPPED | OUTPATIENT
Start: 2023-01-05

## 2023-01-05 NOTE — ASSESSMENT & PLAN NOTE
Assessment:  Mr Annemarie Gallagher is a 52YOM that presents to the neurology clinic today for continued follow up of his one time seizure and new behavioral changes  The wife was assisting with history and reported that his symptoms started around 5 years ago, there are episodes of emotional liability where anger would be a problem, there are episodes where where the patient would be dishonest about something, and attempting to cover up a lie with another one  The patient is not aware at the time of the event that he is being dishonest and states that after the event has passed that he recognizes that he lied however reports that during the event he is unable to tell the truth even when attempting to do so  His father had similar episodes to where a diagnosis of FTD was made  His father's symptoms (please see HPI) started at age 61 and progressed significantly      Plan:  - MRI Brain neuroQuant wwo  - Referral to Neuropsychiatric evaluation  - Referral to Psychiatry  - Follow up on next appointment in April 2023

## 2023-01-05 NOTE — PROGRESS NOTES
Patient ID: Jay Plunkett is a 46 y o  male with ***, who is ***returning to Neurology office for ***follow up of his seizure***  Assessment/Plan:    No problem-specific Assessment & Plan notes found for this encounter  He will No follow-ups on file  Subjective:    HPI  Current seizure medications:  1  ***  Other medications as per Epic  Since his last visit, ***    Prior Seizure Medications: ***    His history was also obtained from his ***, who {was/were} present at today's visit  *** I personally reviewed his {test name} from ***    *** I reviewed ***, as documented in Epic/Resource Data, and summarized above  Objective:    Blood pressure 130/69, pulse 73, height 5' 10" (1 778 m), weight 98 4 kg (217 lb)  Physical Exam    Neurological Exam      ROS:  Review of Systems   Constitutional: Negative  Negative for appetite change and fever  HENT: Negative  Negative for hearing loss, tinnitus, trouble swallowing and voice change  Eyes: Negative  Negative for photophobia, pain and visual disturbance  Respiratory: Negative  Negative for shortness of breath  Cardiovascular: Negative  Negative for palpitations  Gastrointestinal: Negative  Negative for nausea and vomiting  Endocrine: Negative  Negative for cold intolerance  Genitourinary: Negative  Negative for dysuria, frequency and urgency  Musculoskeletal: Negative  Negative for gait problem, myalgias and neck pain  Skin: Negative  Negative for rash  Allergic/Immunologic: Negative  Neurological: Negative  Negative for dizziness, tremors, seizures, syncope, facial asymmetry, speech difficulty, weakness, light-headedness, numbness and headaches  Hematological: Negative  Does not bruise/bleed easily  Psychiatric/Behavioral: Positive for behavioral problems (outburst of anger, uncontrolled, cannot calm down, lying about sensable topics)   Negative for confusion, hallucinations and sleep disturbance  All other systems reviewed and are negative  I personally reviewed the ROS that was entered by the medical assistant    Voice recognition software was used in the generation of this note  There may be unintentional errors including grammatical errors, spelling errors, or pronoun errors

## 2023-01-05 NOTE — ASSESSMENT & PLAN NOTE
Assessment:  Patient reports to the neurology clinic today for follow up on his one time seizure event  He is currently maintained well on Oxcarbazepine, now new seizures, no AED side effects that the patient is aware of (wife reports lethargy)  Plan:  - Continue Oxcarbazepine 600mg QD  - Obtain CBC/plt, CMP  - Obtain oxcarbazepine level  - Follow up in April 2023 with next appointment

## 2023-01-05 NOTE — PROGRESS NOTES
Patient ID: Lisa Jett is a 46 y o  male  Assessment/Plan:  Cognitive and behavioral changes  Assessment:  Mr Draa Shirley is a 52YOM that presents to the neurology clinic today for continued follow up of his one time seizure and new behavioral changes  The wife was assisting with history and reported that his symptoms started around 5 years ago, there are episodes of emotional liability where anger would be a problem, there are episodes where where the patient would be dishonest about something, and attempting to cover up a lie with another one  The patient is not aware at the time of the event that he is being dishonest and states that after the event has passed that he recognizes that he lied however reports that during the event he is unable to tell the truth even when attempting to do so  His father had similar episodes to where a diagnosis of FTD was made  His father's symptoms (please see HPI) started at age 61 and progressed significantly  Plan:  - MRI Brain neuroQuant wwo  - Referral to Neuropsychiatric evaluation  - Referral to Psychiatry  - Follow up on next appointment in April 2023    Single seizure [facility])  Assessment:  Patient reports to the neurology clinic today for follow up on his one time seizure event  He is currently maintained well on Oxcarbazepine, now new seizures, no AED side effects that the patient is aware of (wife reports lethargy)  Plan:  - Continue Oxcarbazepine 600mg QD  - Obtain CBC/plt, CMP  - Obtain oxcarbazepine level  - Follow up in April 2023 with next appointment  Diagnoses and all orders for this visit:    Single seizure ([address] )  -     MRI brain NeuroQuant wo and w contrast; Future  -     Ambulatory referral to Neuropsychology; Future  -     Ambulatory Referral to Psychiatry; Future  -     Comprehensive metabolic panel; Future  -     CBC and Platelet; Future  -     Oxcarbazepine level;  Future    Intractable epilepsy without status epilepticus, unspecified epilepsy type (HCC)  -     OXcarbazepine (TRILEPTAL) 600 mg tablet; Take 1 tablet (600 mg total) by mouth every 12 (twelve) hours    Cognitive and behavioral changes  -     MRI brain NeuroQuant wo and w contrast; Future  -     Ambulatory referral to Neuropsychology; Future  -     Ambulatory Referral to Psychiatry; Future         Subjective: To review:  Mr Ramya Grossman is a 50YOM that presents to the neurology clinic today for evaluation of changes in behaviour  He was last seen in the neurology resident clinic on 4/18/2022 with Dr Wayne Cyr and Dr Meagan Renner for follow up for a once time seizure that occurred in 2014 and has since been maintained on oxcarbazepine 600mg BID  During the last visit the patient reported that he has been seizure free, he was tolerating his AED well and was working for [email or web address] and able to operate a vehicle  In terms of his seizures and AED, the patient reports that he has been doing well, does not report any new seizures, still works with [email or web address] and drives each day and does not report any AED side effects other than some mild fatigue that his wife at [facility]) stated that has been present since the initial seizure in 2014  Since the LOV the patient and wife reports that there has not been any new concerns and events (other than the main concern to be addressed shortly)  The patient has not been ill, not been hospitalized, no new surgery, no new medications  They do have a new puppy at home that does cause some stress      Today the patient reports that there has been new developments in terms of his neurologic status    The wife reports that since about 5 years ago she started to notice some compulsive lying about inconsequential events such as he was not at the grocery store however there are newly purchased groceries or texting to certain drivers and the subsequently deleting the texts messages and reports that he didn't want his wife to see the messages however the wife has no problem with the  in question  The wife and patient both adore baseball, and the patient quit a team and refused to admit that the team was dropped and refused to let her know as to why the team was dropped  The wife reports that they were able to have conversations about daily things at home however there are now discussions about more stress inducing events will cause the patient to go from "zero to 100" in terms of yelling, anger, perseverating, spiraling thoughts out of control  The patient reports that he is unable to control these feelings, he is able to have the anger resolve when going to a separate room, and not broach the topic  The anger would resolve within about once hour once the topic is not longer broached  The patient is aware that he is aware that he is getting out of control and needs to step away  The patient reports that is not aware of being dishonest in the moment however after a while the patient would recognize that the event occurred but reports that he is unable to not be dishonest and that he felt compelled to not tell the truth  The patient and wife reports that when the patient gets angry that there are signs of his face getting red, muscles tensing and the patient feels like his blood pressure is increaseing  The wife reports that the episodes are occurring more frequently to where the patient would be dishonest about the lie that was told  Even after multiple discussions with his wife the patient is reportedly not be able to take a hold of the fact that telling a lie is very upsetting  Over the past year there has been around 4 episodes  The wife reports that the first instance was 5 years ago where the patient reportedly lied by omission that he took a new job whilst not telling her and then attempting to cover up the lie  2018: one episode  2019: two episodes  2020: 1-2 episodes  2021: 3-4 episodes  2022: 5-6 episodes  2023: As of today: none as of yet      The patient reports that his father started demonstrating symptoms around 15 years ago (age of 61) of similar symptoms of lying, anger, inappropriate comments  Around 5 years ago more behavioral changes started to occur and behavior "reverted back to his teenage years" and started to watch more pornography and subsequently had his devices revoked  At the same time there was noticeable changes in the patient was starting to spend a great deal of money  The patients father also displayed more behavioral changes to where if he was told to not do something he would go and do (it) immediately when being unsupervised  The patient demonstrated volatile emotions, to where the patient would cry uncontrollably and spontaneously  A 3 hour vEEG was completed on 4/7/2016: This Routine EEG recorded during wakefulness, drowsiness, and sleep is abnormal  Intermittent, subtle, left temporal delta waves raise the possibility of left temporal neuronal dysfunction  No inter-ictal epileptiform discharges were seen  The following portions of the patient's history were reviewed and updated as appropriate: allergies, current medications, past family history, past medical history, past social history, past surgical history and problem list     Objective:  Blood pressure 130/69, pulse 73, height 5' 10" (1 778 m), weight 98 4 kg (217 lb)  Physical Exam  Vitals reviewed  HENT:      Head: Normocephalic  Nose: Nose normal       Mouth/Throat:      Mouth: Mucous membranes are moist    Eyes:      General: Lids are normal       Extraocular Movements: Extraocular movements intact  Pupils: Pupils are equal, round, and reactive to light  Cardiovascular:      Rate and Rhythm: Normal rate  Pulses: Normal pulses  Musculoskeletal:         General: Normal range of motion  Cervical back: Normal range of motion  Skin:     General: Skin is warm  Capillary Refill: Capillary refill takes less than 2 seconds     Neurological:      General: No focal deficit present  Mental Status: He is alert  Motor: Motor strength is normal       Coordination: Coordination is intact  Deep Tendon Reflexes: Reflexes are normal and symmetric  Psychiatric:         Mood and Affect: Affect is blunt  Speech: Speech normal        Neurological Exam  Mental Status  Awake, alert and oriented to person, place and time  Alert  Oriented to person, place and time  Recent and remote memory are intact  Speech is normal  Language is fluent with no aphasia  Attention and concentration are normal     Cranial Nerves  CN II: Visual acuity is normal  Visual fields full to confrontation  CN III, IV, VI: Extraocular movements intact bilaterally  Normal lids and orbits bilaterally  Pupils equal round and reactive to light bilaterally  CN V: Facial sensation is normal   CN VII: Full and symmetric facial movement  CN VIII: Hearing is normal   CN IX, X: Palate elevates symmetrically  Normal gag reflex  CN XI: Shoulder shrug strength is normal   CN XII: Tongue midline without atrophy or fasciculations  Motor  Normal muscle bulk throughout  No fasciculations present  Normal muscle tone  No abnormal involuntary movements  Strength is 5/5 throughout all four extremities  Sensory  Light touch is normal in upper and lower extremities  Temperature is normal in upper and lower extremities  Vibration is normal in upper and lower extremities  Reflexes  Deep tendon reflexes are 2+ and symmetric in all four extremities  Coordination    Finger-to-nose, rapid alternating movements and heel-to-shin normal bilaterally without dysmetria  Gait  Normal casual, toe, heel and tandem gait  ROS:  Review of Systems   Constitutional: Negative  Negative for appetite change and fever  HENT: Negative  Negative for hearing loss, tinnitus, trouble swallowing and voice change  Eyes: Negative  Negative for photophobia, pain and visual disturbance  Respiratory: Negative  Negative for shortness of breath  Cardiovascular: Negative  Negative for palpitations  Gastrointestinal: Negative  Negative for nausea and vomiting  Endocrine: Negative  Negative for cold intolerance  Genitourinary: Negative  Negative for dysuria, frequency and urgency  Musculoskeletal: Negative  Negative for gait problem, myalgias and neck pain  Skin: Negative  Negative for rash  Allergic/Immunologic: Negative  Neurological: Negative  Negative for dizziness, tremors, seizures, syncope, facial asymmetry, speech difficulty, weakness, light-headedness, numbness and headaches  Hematological: Negative  Does not bruise/bleed easily  Psychiatric/Behavioral: Positive for behavioral problems (outburst of anger, uncontrolled, cannot calm down, lying about sensable topics)  Negative for confusion, hallucinations and sleep disturbance  All other systems reviewed and are negative

## 2023-01-06 ENCOUNTER — TELEPHONE (OUTPATIENT)
Dept: PSYCHIATRY | Facility: CLINIC | Age: 53
End: 2023-01-06

## 2023-01-06 NOTE — TELEPHONE ENCOUNTER
Called patient regarding referral to be placed on proper wait list  LVM for patient to call intake dept ([phone]) back

## 2023-01-11 NOTE — TELEPHONE ENCOUNTER
Called patient regarding referral to be placed on proper wait list  LVM for patient to call intake dept ([phone]) back

## 2023-01-18 ENCOUNTER — TELEPHONE (OUTPATIENT)
Dept: NEUROLOGY | Facility: CLINIC | Age: 53
End: 2023-01-18

## 2023-01-23 ENCOUNTER — TELEPHONE (OUTPATIENT)
Dept: NEUROLOGY | Facility: CLINIC | Age: 53
End: 2023-01-23

## 2023-02-01 ENCOUNTER — TELEPHONE (OUTPATIENT)
Dept: PSYCHIATRY | Facility: CLINIC | Age: 53
End: 2023-02-01

## 2023-02-13 ENCOUNTER — HOSPITAL ENCOUNTER (OUTPATIENT)
Dept: MRI IMAGING | Facility: HOSPITAL | Age: 53
Discharge: HOME | End: 2023-02-13
Attending: PSYCHIATRY & NEUROLOGY

## 2023-02-13 DIAGNOSIS — R41.89 COGNITIVE AND BEHAVIORAL CHANGES: ICD-10-CM

## 2023-02-13 DIAGNOSIS — R56.9 SINGLE SEIZURE (HCC): ICD-10-CM

## 2023-02-13 DIAGNOSIS — R46.89 COGNITIVE AND BEHAVIORAL CHANGES: ICD-10-CM

## 2023-02-13 RX ADMIN — GADOBUTROL 10 ML: 604.72 INJECTION INTRAVENOUS at 07:22

## 2023-03-10 ENCOUNTER — APPOINTMENT (EMERGENCY)
Dept: RADIOLOGY | Facility: HOSPITAL | Age: 53
End: 2023-03-10

## 2023-03-10 ENCOUNTER — HOSPITAL ENCOUNTER (OUTPATIENT)
Facility: HOSPITAL | Age: 53
Setting detail: OBSERVATION
Discharge: HOME | End: 2023-03-12
Attending: EMERGENCY MEDICINE | Admitting: INTERNAL MEDICINE

## 2023-03-10 DIAGNOSIS — N40.0 ENLARGED PROSTATE: ICD-10-CM

## 2023-03-10 DIAGNOSIS — R74.8 ELEVATED CK: ICD-10-CM

## 2023-03-10 DIAGNOSIS — R07.9 CHEST PAIN: ICD-10-CM

## 2023-03-10 DIAGNOSIS — E87.1 HYPONATREMIA: Primary | ICD-10-CM

## 2023-03-10 DIAGNOSIS — R91.1 PULMONARY NODULE: ICD-10-CM

## 2023-03-10 LAB
ATRIAL RATE: 65 BPM
BASOPHILS # BLD AUTO: 0.05 THOUSANDS/ÂΜL (ref 0–0.1)
BASOPHILS NFR BLD AUTO: 1 % (ref 0–1)
EOSINOPHIL # BLD AUTO: 0.1 THOUSAND/ÂΜL (ref 0–0.61)
EOSINOPHIL NFR BLD AUTO: 1 % (ref 0–6)
ERYTHROCYTE [DISTWIDTH] IN BLOOD BY AUTOMATED COUNT: 12.4 % (ref 11.6–15.1)
HCT VFR BLD AUTO: 38.9 % (ref 36.5–49.3)
HGB BLD-MCNC: 13.7 G/DL (ref 12–17)
IMM GRANULOCYTES # BLD AUTO: 0.04 THOUSAND/UL (ref 0–0.2)
IMM GRANULOCYTES NFR BLD AUTO: 1 % (ref 0–2)
LYMPHOCYTES # BLD AUTO: 2.44 THOUSANDS/ÂΜL (ref 0.6–4.47)
LYMPHOCYTES NFR BLD AUTO: 32 % (ref 14–44)
MCH RBC QN AUTO: 31.2 PG (ref 26.8–34.3)
MCHC RBC AUTO-ENTMCNC: 35.2 G/DL (ref 31.4–37.4)
MCV RBC AUTO: 89 FL (ref 82–98)
MONOCYTES # BLD AUTO: 1.02 THOUSAND/ÂΜL (ref 0.17–1.22)
MONOCYTES NFR BLD AUTO: 13 % (ref 4–12)
NEUTROPHILS # BLD AUTO: 4.01 THOUSANDS/ÂΜL (ref 1.85–7.62)
NEUTS SEG NFR BLD AUTO: 52 % (ref 43–75)
NRBC BLD AUTO-RTO: 0 /100 WBCS
P AXIS: 47 DEGREES
PLATELET # BLD AUTO: 248 THOUSANDS/UL (ref 149–390)
PMV BLD AUTO: 9.1 FL (ref 8.9–12.7)
PR INTERVAL: 170 MS
QRS AXIS: 33 DEGREES
QRSD INTERVAL: 108 MS
QT INTERVAL: 406 MS
QTC INTERVAL: 422 MS
RBC # BLD AUTO: 4.39 MILLION/UL (ref 3.88–5.62)
T WAVE AXIS: 28 DEGREES
VENTRICULAR RATE: 65 BPM
WBC # BLD AUTO: 7.66 THOUSAND/UL (ref 4.31–10.16)

## 2023-03-10 RX ADMIN — NITROGLYCERIN 0.5 INCH: 20 OINTMENT TOPICAL at 23:42

## 2023-03-11 ENCOUNTER — APPOINTMENT (EMERGENCY)
Dept: CT IMAGING | Facility: HOSPITAL | Age: 53
End: 2023-03-11

## 2023-03-11 PROBLEM — E87.1 HYPONATREMIA: Status: ACTIVE | Noted: 2023-03-11

## 2023-03-11 PROBLEM — R07.9 CHEST PAIN: Status: ACTIVE | Noted: 2023-03-11

## 2023-03-11 PROBLEM — R74.8 ELEVATED CK: Status: ACTIVE | Noted: 2023-03-11

## 2023-03-11 LAB
2HR DELTA HS TROPONIN: 0 NG/L
ALBUMIN SERPL BCP-MCNC: 4.5 G/DL (ref 3.5–5)
ALP SERPL-CCNC: 59 U/L (ref 34–104)
ALT SERPL W P-5'-P-CCNC: 13 U/L (ref 7–52)
ANION GAP SERPL CALCULATED.3IONS-SCNC: 6 MMOL/L (ref 4–13)
ANION GAP SERPL CALCULATED.3IONS-SCNC: 6 MMOL/L (ref 4–13)
ANION GAP SERPL CALCULATED.3IONS-SCNC: 7 MMOL/L (ref 4–13)
ANION GAP SERPL CALCULATED.3IONS-SCNC: 9 MMOL/L (ref 4–13)
APTT PPP: 30 SECONDS (ref 23–37)
AST SERPL W P-5'-P-CCNC: 19 U/L (ref 13–39)
ATRIAL RATE: 51 BPM
BILIRUB SERPL-MCNC: 1.03 MG/DL (ref 0.2–1)
BUN SERPL-MCNC: 12 MG/DL (ref 5–25)
BUN SERPL-MCNC: 13 MG/DL (ref 5–25)
BUN SERPL-MCNC: 13 MG/DL (ref 5–25)
BUN SERPL-MCNC: 16 MG/DL (ref 5–25)
CALCIUM SERPL-MCNC: 8.8 MG/DL (ref 8.4–10.2)
CALCIUM SERPL-MCNC: 8.8 MG/DL (ref 8.4–10.2)
CALCIUM SERPL-MCNC: 8.9 MG/DL (ref 8.4–10.2)
CALCIUM SERPL-MCNC: 9.2 MG/DL (ref 8.4–10.2)
CARDIAC TROPONIN I PNL SERPL HS: 3 NG/L
CARDIAC TROPONIN I PNL SERPL HS: 3 NG/L
CHLORIDE SERPL-SCNC: 92 MMOL/L (ref 96–108)
CHLORIDE SERPL-SCNC: 96 MMOL/L (ref 96–108)
CHLORIDE SERPL-SCNC: 98 MMOL/L (ref 96–108)
CHLORIDE SERPL-SCNC: 99 MMOL/L (ref 96–108)
CHOLEST SERPL-MCNC: 221 MG/DL
CK MB SERPL-MCNC: 1.2 % (ref 0–2.5)
CK MB SERPL-MCNC: 5.3 NG/ML (ref 0.6–6.3)
CK SERPL-CCNC: 460 U/L (ref 39–308)
CO2 SERPL-SCNC: 23 MMOL/L (ref 21–32)
CO2 SERPL-SCNC: 24 MMOL/L (ref 21–32)
CO2 SERPL-SCNC: 25 MMOL/L (ref 21–32)
CO2 SERPL-SCNC: 25 MMOL/L (ref 21–32)
CORTIS AM PEAK SERPL-MCNC: 20.8 UG/DL (ref 4.2–22.4)
CREAT SERPL-MCNC: 0.82 MG/DL (ref 0.6–1.3)
CREAT SERPL-MCNC: 0.92 MG/DL (ref 0.6–1.3)
CREAT SERPL-MCNC: 0.97 MG/DL (ref 0.6–1.3)
CREAT SERPL-MCNC: 0.99 MG/DL (ref 0.6–1.3)
D DIMER PPP FEU-MCNC: 0.28 UG/ML FEU
EST. AVERAGE GLUCOSE BLD GHB EST-MCNC: 105 MG/DL
GFR SERPL CREATININE-BSD FRML MDRD: 101 ML/MIN/1.73SQ M
GFR SERPL CREATININE-BSD FRML MDRD: 87 ML/MIN/1.73SQ M
GFR SERPL CREATININE-BSD FRML MDRD: 89 ML/MIN/1.73SQ M
GFR SERPL CREATININE-BSD FRML MDRD: 95 ML/MIN/1.73SQ M
GLUCOSE SERPL-MCNC: 107 MG/DL (ref 65–140)
GLUCOSE SERPL-MCNC: 113 MG/DL (ref 65–140)
GLUCOSE SERPL-MCNC: 125 MG/DL (ref 65–140)
GLUCOSE SERPL-MCNC: 99 MG/DL (ref 65–140)
HBA1C MFR BLD: 5.3 %
HDLC SERPL-MCNC: 64 MG/DL
INR PPP: 1.13 (ref 0.84–1.19)
LDLC SERPL CALC-MCNC: 146 MG/DL (ref 0–100)
LIPASE SERPL-CCNC: 21 U/L (ref 11–82)
OSMOLALITY UR/SERPL-RTO: 266 MMOL/KG (ref 282–298)
OSMOLALITY UR: 161 MMOL/KG
P AXIS: 47 DEGREES
POTASSIUM SERPL-SCNC: 3.4 MMOL/L (ref 3.5–5.3)
POTASSIUM SERPL-SCNC: 3.5 MMOL/L (ref 3.5–5.3)
POTASSIUM SERPL-SCNC: 3.9 MMOL/L (ref 3.5–5.3)
POTASSIUM SERPL-SCNC: 3.9 MMOL/L (ref 3.5–5.3)
PR INTERVAL: 158 MS
PROT SERPL-MCNC: 6.8 G/DL (ref 6.4–8.4)
PROTHROMBIN TIME: 14.7 SECONDS (ref 11.6–14.5)
QRS AXIS: 41 DEGREES
QRSD INTERVAL: 108 MS
QT INTERVAL: 444 MS
QTC INTERVAL: 409 MS
SODIUM 24H UR-SCNC: 26 MOL/L
SODIUM SERPL-SCNC: 124 MMOL/L (ref 135–147)
SODIUM SERPL-SCNC: 128 MMOL/L (ref 135–147)
SODIUM SERPL-SCNC: 129 MMOL/L (ref 135–147)
SODIUM SERPL-SCNC: 129 MMOL/L (ref 135–147)
T WAVE AXIS: 23 DEGREES
TRIGL SERPL-MCNC: 53 MG/DL
TSH SERPL DL<=0.05 MIU/L-ACNC: 1.34 UIU/ML (ref 0.45–4.5)
URATE SERPL-MCNC: 3.2 MG/DL (ref 3.5–8.5)
VENTRICULAR RATE: 51 BPM

## 2023-03-11 RX ORDER — OXCARBAZEPINE 150 MG/1
600 TABLET, FILM COATED ORAL EVERY 12 HOURS SCHEDULED
Status: DISCONTINUED | OUTPATIENT
Start: 2023-03-11 | End: 2023-03-12 | Stop reason: HOSPADM

## 2023-03-11 RX ORDER — ACETAMINOPHEN 325 MG/1
650 TABLET ORAL EVERY 6 HOURS PRN
Status: DISCONTINUED | OUTPATIENT
Start: 2023-03-11 | End: 2023-03-12 | Stop reason: HOSPADM

## 2023-03-11 RX ORDER — POTASSIUM CHLORIDE 20 MEQ/1
20 TABLET, EXTENDED RELEASE ORAL ONCE
Status: COMPLETED | OUTPATIENT
Start: 2023-03-11 | End: 2023-03-11

## 2023-03-11 RX ORDER — SODIUM CHLORIDE 9 MG/ML
50 INJECTION, SOLUTION INTRAVENOUS CONTINUOUS
Status: DISCONTINUED | OUTPATIENT
Start: 2023-03-11 | End: 2023-03-11

## 2023-03-11 RX ORDER — SODIUM CHLORIDE 1000 MG
1 TABLET, SOLUBLE MISCELLANEOUS 2 TIMES DAILY WITH MEALS
Status: DISCONTINUED | OUTPATIENT
Start: 2023-03-11 | End: 2023-03-12 | Stop reason: HOSPADM

## 2023-03-11 RX ADMIN — POTASSIUM CHLORIDE 20 MEQ: 1500 TABLET, EXTENDED RELEASE ORAL at 01:51

## 2023-03-11 RX ADMIN — OXCARBAZEPINE 600 MG: 150 TABLET, FILM COATED ORAL at 08:51

## 2023-03-11 RX ADMIN — OXCARBAZEPINE 600 MG: 150 TABLET, FILM COATED ORAL at 20:25

## 2023-03-11 RX ADMIN — SODIUM CHLORIDE 1 G: 1 TABLET ORAL at 17:36

## 2023-03-11 RX ADMIN — SODIUM CHLORIDE 75 ML/HR: 0.9 INJECTION, SOLUTION INTRAVENOUS at 08:51

## 2023-03-11 RX ADMIN — IOHEXOL 100 ML: 350 INJECTION, SOLUTION INTRAVENOUS at 01:34

## 2023-03-11 RX ADMIN — SODIUM CHLORIDE 1000 ML: 0.9 INJECTION, SOLUTION INTRAVENOUS at 00:51

## 2023-03-11 NOTE — ASSESSMENT & PLAN NOTE
• Sodium level upon admission: 124  • Suspect secondary to dehydration  • Treatment: Received 1 L of NSS bolus in ED Repeat BMP now prior to initiating more IV hydration  • Additional Studies: TSH, urine osmolality, serum osmolality, sodium urine, uric acid and cortisol level in AM   • Monitor BMP

## 2023-03-11 NOTE — H&P
[address] 1970, 46 y o  male MRN: [id number]  Unit/Bed#: S -01 Encounter: [id number]  Primary Care Provider: Yash Grubbs PA-C   Date and time admitted to hospital: 3/10/2023 11:16 PM    * Hyponatremia  Assessment & Plan  • Sodium level upon admission: 124   • Suspect secondary to dehydration  • Treatment: Received 1 L of NSS bolus in ED Repeat BMP now prior to initiating more IV hydration  • Additional Studies: TSH, urine osmolality, serum osmolality, sodium urine, uric acid and cortisol level in AM   • Monitor BMP  Chest pain  Assessment & Plan  • Patient Presentation: Patient originally presented with complaints of dull, right-sided chest pain with radiation down right arm 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 and also family friend's  yesterday  Patient reports that he took 2 baby aspirin at home  • Likely etiology: Stress/anxiety versus MSK  • CTA dissection C/A/P: "No evidence of aortic aneurysm or dissection   No evidence of acute pathology throughout the chest, abdomen and pelvis "  • SHERRON: 0  • Initial Troponin: 3  o Trend x3 or to peak  • Initial EKG: NSR, no ischemic changes noted, heart rate 65   o Monitor on telemetry  • Check fasting lipid panel and A1c  • Admit under Observation Status  Elevated CK  Assessment & Plan  · Present on admission, CK of 460  · IV hydration  Single seizure [facility])  Assessment & Plan  · Patient was noted to have one-time seizure that occurred in   · Continue oxcarbazepine 600 mg twice daily  · Oxcarbazepine level pending  Cognitive and behavioral changes  Assessment & Plan  · Patient follows with Dr Martita Zamarripa of neurology as an outpatient  · Patient exhibited increased symptoms of compulsive lying about inconsequential events  · Continue outpatient follow-up      VTE Pharmacologic Prophylaxis: VTE Score: 2 Low Risk (Score 0-2) - Encourage Ambulation  Code Status: Level 1 - Full Code   Discussion with family: Updated  (wife) at bedside  Anticipated Length of Stay: Patient will be admitted on an observation basis with an anticipated length of stay of less than 2 midnights secondary to chest pain and hyponatremia   Total Time Spent on Date of Encounter in care of patient: 75 minutes This time was spent on one or more of the following: performing physical exam; counseling and coordination of care; obtaining or reviewing history; documenting in the medical record; reviewing/ordering tests, medications or procedures; communicating with other healthcare professionals and discussing with patient's family/caregivers  Chief Complaint: Chest pain    History of Present Illness:  Zoraida Dixon is a 46 y o  male with a PMH of a single seizure and cognitive/behavior changes who presents with complaints of dull, right-sided chest pain with radiation down right arm 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 and also family friend's  yesterday  Also, patient's wife was recently admitted to the hospital which was stressful  Patient reports that he took 2 baby aspirin at home  Upon evaluation in the ED, patient was noted to be hyponatremic  Patient will be admitted for serial troponin trending and serial BMPs  Review of Systems:  Review of Systems   Constitutional: Negative for chills, diaphoresis and fever  Respiratory: Negative for shortness of breath  Cardiovascular: Positive for chest pain  Gastrointestinal: Negative for abdominal pain, nausea and vomiting  Musculoskeletal: Positive for arthralgias  Neurological: Negative for dizziness  All other systems reviewed and are negative  Past Medical and Surgical History:   History reviewed   No pertinent past medical history  History reviewed  No pertinent surgical history  Meds/Allergies:  Prior to Admission medications    Medication Sig Start Date End Date Taking? Authorizing Provider   Ibuprofen 200 MG CAPS Take 1 capsule by mouth      Historical Provider, MD   OXcarbazepine (TRILEPTAL) 600 mg tablet Take 1 tablet (600 mg total) by mouth every 12 (twelve) hours 1/5/23   Aurea López MD     I have reviewed home medications with patient personally  Allergies: No Known Allergies    Social History:  Marital Status: /Civil Union   Occupation:   Patient Pre-hospital Living Situation: Home, With spouse  Patient Pre-hospital Level of Mobility: walks  Patient Pre-hospital Diet Restrictions: None  Substance Use History:   Social History     Substance and Sexual Activity   Alcohol Use Yes    Comment: Have a beer occasionally     Social History     Tobacco Use   Smoking Status Never   Smokeless Tobacco Never     Social History     Substance and Sexual Activity   Drug Use Never       Family History:  Family History   Problem Relation Age of Onset   • No Known Problems Mother    • Frontotemporal dementia Father    • Alzheimer's disease Paternal Grandmother        Physical Exam:     Vitals:   Blood Pressure: 132/83 (03/11/23 0347)  Pulse: 65 (03/11/23 0347)  Temperature: 97 7 °F (36 5 °C) (03/11/23 0347)  Temp Source: Oral (03/11/23 0347)  Respirations: 17 (03/11/23 0347)  Height: 5' 10" (177 8 cm) (03/11/23 0347)  Weight - Scale: 98 4 kg (217 lb) (03/11/23 0347)  SpO2: 98 % (03/11/23 0347)    Physical Exam  Vitals and nursing note reviewed  Constitutional:       General: He is not in acute distress  Appearance: He is not ill-appearing or diaphoretic  HENT:      Head: Normocephalic  Nose: Nose normal       Mouth/Throat:      Pharynx: Oropharynx is clear  Eyes:      General: No scleral icterus       Conjunctiva/sclera: Conjunctivae normal    Cardiovascular:      Rate and Rhythm: Normal rate and regular rhythm  Pulses: Normal pulses  Heart sounds: Normal heart sounds  No murmur heard  Pulmonary:      Effort: Pulmonary effort is normal  No respiratory distress  Breath sounds: Normal breath sounds  No wheezing, rhonchi or rales  Chest:      Chest wall: No tenderness  Abdominal:      General: Bowel sounds are normal  There is no distension  Palpations: Abdomen is soft  Tenderness: There is no abdominal tenderness  Musculoskeletal:         General: No swelling or deformity  Normal range of motion  Cervical back: Normal range of motion  Skin:     General: Skin is warm and dry  Neurological:      Mental Status: He is alert and oriented to person, place, and time  Psychiatric:         Speech: Speech normal           Additional Data:     Lab Results:  Results from last 7 days   Lab Units 03/10/23  2341   WBC Thousand/uL 7 66   HEMOGLOBIN g/dL 13 7   HEMATOCRIT % 38 9   PLATELETS Thousands/uL 248   NEUTROS PCT % 52   LYMPHS PCT % 32   MONOS PCT % 13*   EOS PCT % 1     Results from last 7 days   Lab Units 03/10/23  2341   SODIUM mmol/L 124*   POTASSIUM mmol/L 3 4*   CHLORIDE mmol/L 92*   CO2 mmol/L 25   BUN mg/dL 13   CREATININE mg/dL 0 92   ANION GAP mmol/L 7   CALCIUM mg/dL 9 2   ALBUMIN g/dL 4 5   TOTAL BILIRUBIN mg/dL 1 03*   ALK PHOS U/L 59   ALT U/L 13   AST U/L 19   GLUCOSE RANDOM mg/dL 99     Results from last 7 days   Lab Units 03/10/23  2341   INR  1 13                   Lines/Drains:  Invasive Devices     Peripheral Intravenous Line  Duration           Peripheral IV 03/10/23 Left Antecubital <1 day                    Imaging: Reviewed radiology reports from this admission including: chest CT scan, abdominal/pelvic CT and CT head and Personally reviewed the following imaging: chest xray  CT head without contrast   ED Interpretation by Kole Sanchez MD (03/11 0153)   FINDINGS:     PARENCHYMA:  No intracranial mass, mass effect or midline shift   No CT signs of acute infarction  No acute parenchymal hemorrhage      VENTRICLES AND EXTRA-AXIAL SPACES:  Normal for the patient's age      VISUALIZED ORBITS: Normal visualized orbits      PARANASAL SINUSES: Normal visualized paranasal sinuses      CALVARIUM AND EXTRACRANIAL SOFT TISSUES:  Normal      IMPRESSION:     No acute intracranial abnormality                  Workstation performed: [id number]         Final Result by Jerry Bynum MD (03/11 0150)      No acute intracranial abnormality  Workstation performed: [id number]         CTA dissection protocol chest abdomen pelvis w wo contrast   ED Interpretation by Guiliana Klein MD (03/11 0203)   FINDINGS:      AORTA:  There is no aortic dissection or intramural hematoma  There is no aortic aneurysm      CHEST     LUNGS:  4 mm right lower lobe nodule (series 304, 134) 5 mm meter nodule at the left upper lobe (series 304, image 72)     There is no tracheal or endobronchial lesion      PLEURA:  Unremarkable      HEART/PULMONARY ARTERIAL TREE:  Unremarkable for patient's age      MEDIASTINUM AND DIPAK:  Unremarkable      CHEST WALL AND LOWER NECK:   Unremarkable      ABDOMEN     LIVER/BILIARY TREE:  Unremarkable      GALLBLADDER:  No calcified gallstones  No pericholecystic inflammatory change      SPLEEN:  Unremarkable      PANCREAS:  Unremarkable      ADRENAL GLANDS:  Unremarkable      KIDNEYS/URETERS:  Unremarkable  No hydronephrosis      STOMACH AND BOWEL:  Unremarkable      APPENDIX:  No findings to suggest appendicitis      ABDOMINOPELVIC CAVITY:  No ascites or free intraperitoneal air  No lymphadenopathy      PELVIS     REPRODUCTIVE ORGANS:  The prostate is enlarged      URI   NARY BLADDER:  Unremarkable      ABDOMINAL WALL/INGUINAL REGIONS:  There is a small fat-containing umbilical hernia      OSSEOUS STRUCTURES:  No acute fracture or destructive osseous lesion      IMPRESSION:     No evidence of aortic aneurysm or dissection    No evidence of acute pathology throughout the chest, abdomen and pelvis                 Workstation performed: [id number]      Final Result by Vonda Greco MD (03/11 0157)      No evidence of aortic aneurysm or dissection  No evidence of acute pathology throughout the chest, abdomen and pelvis                  Workstation performed: [id number]         XR chest 1 view portable   ED Interpretation by Ken Smith MD (03/10 2618)   No acute disease read by me  EKG and Other Studies Reviewed on Admission:   · EKG: NSR  HR 75     ** Please Note: This note has been constructed using a voice recognition system   **

## 2023-03-11 NOTE — ASSESSMENT & PLAN NOTE
· Patient follows with Dr Bianca Brunner of neurology as an outpatient  · Patient exhibited increased symptoms of compulsive lying about inconsequential events  · Continue outpatient follow-up

## 2023-03-11 NOTE — PROGRESS NOTES
Pt arrived to unit via wheelchair  AAOx4, ambulated to bed with steady gait  Dual RN skin check completed with JR, skin intact  Pt oriented to room and call bell  Pt independent  Pt instructed to use call bell for assistance

## 2023-03-11 NOTE — PLAN OF CARE
Problem: PAIN - ADULT  Goal: Verbalizes/displays adequate comfort level or baseline comfort level  Description: Interventions:  - Encourage patient to monitor pain and request assistance  - Assess pain using appropriate pain scale  - Administer analgesics based on type and severity of pain and evaluate response  - Implement non-pharmacological measures as appropriate and evaluate response  - Consider cultural and social influences on pain and pain management  - Notify physician/advanced practitioner if interventions unsuccessful or patient reports new pain  Outcome: Progressing     Problem: INFECTION - ADULT  Goal: Absence or prevention of progression during hospitalization  Description: INTERVENTIONS:  - Assess and monitor for signs and symptoms of infection  - Monitor lab/diagnostic results  - Monitor all insertion sites, i e  indwelling lines, tubes, and drains  - Monitor endotracheal if appropriate and nasal secretions for changes in amount and color  - [place] appropriate cooling/warming therapies per order  - Administer medications as ordered  - Instruct and encourage patient and family to use good hand hygiene technique  - Identify and instruct in appropriate isolation precautions for identified infection/condition  Outcome: Progressing  Goal: Absence of fever/infection during neutropenic period  Description: INTERVENTIONS:  - Monitor WBC    Outcome: Progressing     Problem: SAFETY ADULT  Goal: Patient will remain free of falls  Description: INTERVENTIONS:  - Educate patient/family on patient safety including physical limitations  - Instruct patient to call for assistance with activity   - Consult OT/PT to assist with strengthening/mobility   - Keep Call bell within reach  - Keep bed low and locked with side rails adjusted as appropriate  - Keep care items and personal belongings within reach  - Initiate and maintain comfort rounds  - Make Fall Risk Sign visible to staff  - Offer Toileting every  Hours, in advance of need  - Initiate/Maintain alarm  - Obtain necessary fall risk management equipment:   - Apply yellow socks and bracelet for high fall risk patients  - Consider moving patient to room near nurses station  Outcome: Progressing  Goal: Maintain or return to baseline ADL function  Description: INTERVENTIONS:  -  Assess patient's ability to carry out ADLs; assess patient's baseline for ADL function and identify physical deficits which impact ability to perform ADLs (bathing, care of mouth/teeth, toileting, grooming, dressing, etc )  - Assess/evaluate cause of self-care deficits   - Assess range of motion  - Assess patient's mobility; develop plan if impaired  - Assess patient's need for assistive devices and provide as appropriate  - Encourage maximum independence but intervene and supervise when necessary  - Involve family in performance of ADLs  - Assess for home care needs following discharge   - Consider OT consult to assist with ADL evaluation and planning for discharge  - Provide patient education as appropriate  Outcome: Progressing  Goal: Maintains/Returns to pre admission functional level  Description: INTERVENTIONS:  - Perform BMAT or MOVE assessment daily    - Set and communicate daily mobility goal to care team and patient/family/caregiver  - Collaborate with rehabilitation services on mobility goals if consulted  - Perform Range of Motion  times a day  - Reposition patient every  hours    - Dangle patient  times a day  - Stand patient  times a day  - Ambulate patient  times a day  - Out of bed to chair  times a day   - Out of bed for meals  times a day  - Out of bed for toileting  - Record patient progress and toleration of activity level   Outcome: Progressing     Problem: DISCHARGE PLANNING  Goal: Discharge to home or other facility with appropriate resources  Description: INTERVENTIONS:  - Identify barriers to discharge w/patient and caregiver  - Arrange for needed discharge resources and transportation as appropriate  - Identify discharge learning needs (meds, wound care, etc )  - Arrange for interpretive services to assist at discharge as needed  - Refer to Case Management Department for coordinating discharge planning if the patient needs post-hospital services based on physician/advanced practitioner order or complex needs related to functional status, cognitive ability, or social support system  Outcome: Progressing     Problem: Knowledge Deficit  Goal: Patient/family/caregiver demonstrates understanding of disease process, treatment plan, medications, and discharge instructions  Description: Complete learning assessment and assess knowledge base    Interventions:  - Provide teaching at level of understanding  - Provide teaching via preferred learning methods  Outcome: Progressing

## 2023-03-11 NOTE — ASSESSMENT & PLAN NOTE
• Sodium level upon admission: 124   • Serum opsmo: 266 (low)  • Urine osmo: 151 (low)  • Urine sodium: 26  • TSH, cortisol normal    • Received 1 L of NSS bolus in ED    • Etiology: Multifactorial likely secondary to poor intake for the last few days with component of SIADH in the settings of oxcarbazepine  Plan  Na: 133  Continue sodium chloride tablets 1g bid for 3 days  BMP in 3 days    Follow-up with your PCP in 3 days   follow-up with nephrology

## 2023-03-11 NOTE — PLAN OF CARE
Problem: PAIN - ADULT  Goal: Verbalizes/displays adequate comfort level or baseline comfort level  Description: Interventions:  - Encourage patient to monitor pain and request assistance  - Assess pain using appropriate pain scale  - Administer analgesics based on type and severity of pain and evaluate response  - Implement non-pharmacological measures as appropriate and evaluate response  - Consider cultural and social influences on pain and pain management  - Notify physician/advanced practitioner if interventions unsuccessful or patient reports new pain  Outcome: Progressing     Problem: SAFETY ADULT  Goal: Patient will remain free of falls  Description: INTERVENTIONS:  - Educate patient/family on patient safety including physical limitations  - Instruct patient to call for assistance with activity   - Consult OT/PT to assist with strengthening/mobility   - Keep Call bell within reach  - Keep bed low and locked with side rails adjusted as appropriate  - Keep care items and personal belongings within reach  - Initiate and maintain comfort rounds  - Make Fall Risk Sign visible to staff  - Offer Toileting every 2 Hours, in advance of need  - Initiate/Maintain bed alarm  - Obtain necessary fall risk management equipment: bed alarm  - Apply yellow socks and bracelet for high fall risk patients  - Consider moving patient to room near nurses station  Outcome: Progressing  Goal: Maintain or return to baseline ADL function  Description: INTERVENTIONS:  -  Assess patient's ability to carry out ADLs; assess patient's baseline for ADL function and identify physical deficits which impact ability to perform ADLs (bathing, care of mouth/teeth, toileting, grooming, dressing, etc )  - Assess/evaluate cause of self-care deficits   - Assess range of motion  - Assess patient's mobility; develop plan if impaired  - Assess patient's need for assistive devices and provide as appropriate  - Encourage maximum independence but intervene and supervise when necessary  - Involve family in performance of ADLs  - Assess for home care needs following discharge   - Consider OT consult to assist with ADL evaluation and planning for discharge  - Provide patient education as appropriate  Outcome: Progressing  Goal: Maintains/Returns to pre admission functional level  Description: INTERVENTIONS:  - Perform BMAT or MOVE assessment daily    - Set and communicate daily mobility goal to care team and patient/family/caregiver  - Collaborate with rehabilitation services on mobility goals if consulted  - Perform Range of Motion 3 times a day  - Reposition patient every 2 hours    - Dangle patient 3 times a day  - Stand patient 3 times a day  - Ambulate patient 3 times a day  - Out of bed to chair 3 times a day   - Out of bed for meals 3 times a day  - Out of bed for toileting  - Record patient progress and toleration of activity level   Outcome: Progressing

## 2023-03-11 NOTE — ED PROVIDER NOTES
History  Chief Complaint   Patient presents with   • Chest Pain     Pt reports dull right sided chest pain that radiates down right arm that started at work tonight  Pt took aspirin at home  No cardiac history     Patient is a 46year old male with right sided dull chest pain with radiation to R arm and back tonight  No prior episodes  No trauma  No travel  No N/V  No sob  No cough or fever  No early CAD in family  No smoking  (+) increased stress  No recent old records from this ED seen on computer system  [facility] website checked on this patient and no Rx found  History provided by:  Patient and spouse   used: No    Chest Pain  Associated symptoms: back pain    Associated symptoms: no cough, no fever, no nausea, no shortness of breath and not vomiting        Prior to Admission Medications   Prescriptions Last Dose Informant Patient Reported? Taking? Ibuprofen 200 MG CAPS   Yes No   Sig: Take 1 capsule by mouth     OXcarbazepine (TRILEPTAL) 600 mg tablet   No No   Sig: Take 1 tablet (600 mg total) by mouth every 12 (twelve) hours      Facility-Administered Medications: None       History reviewed  No pertinent past medical history  History reviewed  No pertinent surgical history  Family History   Problem Relation Age of Onset   • No Known Problems Mother    • Frontotemporal dementia Father    • Alzheimer's disease Paternal Grandmother      I have reviewed and agree with the history as documented  E-Cigarette/Vaping   • E-Cigarette Use Never User      E-Cigarette/Vaping Substances     Social History     Tobacco Use   • Smoking status: Never   • Smokeless tobacco: Never   Vaping Use   • Vaping Use: Never used   Substance Use Topics   • Alcohol use: Yes     Comment: Have a beer occasionally   • Drug use: Never       Review of Systems   Constitutional: Negative for fever  Respiratory: Negative for cough and shortness of breath  Cardiovascular: Positive for chest pain  Gastrointestinal: Negative for nausea and vomiting  Musculoskeletal: Positive for arthralgias, back pain and myalgias  Psychiatric/Behavioral: The patient is nervous/anxious  All other systems reviewed and are negative  Physical Exam  Physical Exam  Vitals and nursing note reviewed  Constitutional:       General: He is in acute distress (moderate)  HENT:      Head: Normocephalic and atraumatic  Mouth/Throat:      Comments: Mask in place  Eyes:      General: No scleral icterus  Cardiovascular:      Rate and Rhythm: Normal rate and regular rhythm  Heart sounds: Normal heart sounds  No murmur heard  Pulmonary:      Effort: Pulmonary effort is normal  No respiratory distress  Breath sounds: Normal breath sounds  No stridor  No wheezing, rhonchi or rales  Chest:      Chest wall: No tenderness  Abdominal:      General: Bowel sounds are normal  There is no distension  Palpations: Abdomen is soft  Tenderness: There is no abdominal tenderness  Musculoskeletal:         General: No tenderness or deformity  Cervical back: Normal range of motion and neck supple  Right lower leg: No edema  Left lower leg: No edema  Skin:     General: Skin is warm and dry  Findings: No erythema or rash  Neurological:      General: No focal deficit present  Mental Status: He is alert and oriented to person, place, and time     Psychiatric:         Mood and Affect: Mood normal          Vital Signs  ED Triage Vitals   Temperature Pulse Respirations Blood Pressure SpO2   03/10/23 2322 03/10/23 2320 03/10/23 2320 03/10/23 2320 03/10/23 2320   97 7 °F (36 5 °C) 67 18 123/68 98 %      Temp Source Heart Rate Source Patient Position - Orthostatic VS BP Location FiO2 (%)   03/10/23 2322 03/10/23 2320 -- 03/10/23 2320 --   Oral Monitor  Right arm       Pain Score       03/10/23 2320       5           Vitals:    03/10/23 2320   BP: 123/68   Pulse: 67         Visual Acuity      ED Medications  Medications   nitroglycerin (NITRO-BID) 2 % TD ointment 0 5 inch (0 5 inches Topical Given 3/10/23 2342)   sodium chloride 0 9 % bolus 1,000 mL (0 mL Intravenous Stopped 3/11/23 0230)   potassium chloride (K-DUR,KLOR-CON) CR tablet 20 mEq (20 mEq Oral Given 3/11/23 0151)   iohexol (OMNIPAQUE) 350 MG/ML injection (SINGLE-DOSE) 100 mL (100 mL Intravenous Given 3/11/23 0134)       Diagnostic Studies  Results Reviewed     Procedure Component Value Units Date/Time    HS Troponin I 2hr [[id number]]  (Normal) Collected: 03/11/23 0153    Lab Status: Final result Specimen: Blood from Arm, Left Updated: 03/11/23 0233     hs TnI 2hr 3 ng/L      Delta 2hr hsTnI 0 ng/L     Protime-INR [[id number]]  (Abnormal) Collected: 03/10/23 2341    Lab Status: Final result Specimen: Blood from Arm, Left Updated: 03/11/23 0102     Protime 14 7 seconds      INR 1 13    APTT [[id number]]  (Normal) Collected: 03/10/23 2341    Lab Status: Final result Specimen: Blood from Arm, Left Updated: 03/11/23 0102     PTT 30 seconds     D-Dimer [[id number]]  (Normal) Collected: 03/10/23 2341    Lab Status: Final result Specimen: Blood from Arm, Left Updated: 03/11/23 0102     D-Dimer, Quant 0 28 ug/ml FEU     Narrative: In the evaluation for possible pulmonary embolism, in the appropriate (Well's Score of 4 or less) patient, the age adjusted d-dimer cutoff for this patient can be calculated as:    Age x 0 01 (in ug/mL) for Age-adjusted D-dimer exclusion threshold for a patient over 50 years      CKMB [[id number]]  (Normal) Collected: 03/10/23 2341    Lab Status: Final result Specimen: Blood from Arm, Left Updated: 03/11/23 0047     CK-MB Index 1 2 %      CK-MB 5 3 ng/mL     HS Troponin 0hr (reflex protocol) [[id number]]  (Normal) Collected: 03/10/23 2341    Lab Status: Final result Specimen: Blood from Arm, Left Updated: 03/11/23 0022     hs TnI 0hr 3 ng/L     Comprehensive metabolic panel [[id number]]  (Abnormal) Collected: 03/10/23 2341    Lab Status: Final result Specimen: Blood from Arm, Left Updated: 03/11/23 0013     Sodium 124 mmol/L      Potassium 3 4 mmol/L      Chloride 92 mmol/L      CO2 25 mmol/L      ANION GAP 7 mmol/L      BUN 13 mg/dL      Creatinine 0 92 mg/dL      Glucose 99 mg/dL      Calcium 9 2 mg/dL      AST 19 U/L      ALT 13 U/L      Alkaline Phosphatase 59 U/L      Total Protein 6 8 g/dL      Albumin 4 5 g/dL      Total Bilirubin 1 03 mg/dL      eGFR 95 ml/min/1 73sq m     Narrative:      National Kid[employer] Disease Foundation guidelines for Chronic Kidney Disease (CKD):   •  Stage 1 with normal or high GFR (GFR > 90 mL/min/1 73 square meters)  •  Stage 2 Mild CKD (GFR = 60-89 mL/min/1 73 square meters)  •  Stage 3A Moderate CKD (GFR = 45-59 mL/min/1 73 square meters)  •  Stage 3B Moderate CKD (GFR = 30-44 mL/min/1 73 square meters)  •  Stage 4 Severe CKD (GFR = 15-29 mL/min/1 73 square meters)  •  Stage 5 End Stage CKD (GFR <15 mL/min/1 73 square meters)  Note: GFR calculation is accurate only with a steady state creatinine    Lipase [[id number]]  (Normal) Collected: 03/10/23 2341    Lab Status: Final result Specimen: Blood from Arm, Left Updated: 03/11/23 0013     Lipase 21 u/L     CK Total with Reflex CKMB [[id number]]  (Abnormal) Collected: 03/10/23 2341    Lab Status: Final result Specimen: Blood from Arm, Left Updated: 03/11/23 0013     Total  U/L     CBC and differential [[id number]]  (Abnormal) Collected: 03/10/23 2341    Lab Status: Final result Specimen: Blood from Arm, Left Updated: 03/10/23 2350     WBC 7 66 Thousand/uL      RBC 4 39 Million/uL      Hemoglobin 13 7 g/dL      Hematocrit 38 9 %      MCV 89 fL      MCH 31 2 pg      MCHC 35 2 g/dL      RDW 12 4 %      MPV 9 1 fL      Platelets 999 Thousands/uL      nRBC 0 /100 WBCs      Neutrophils Relative 52 %      Immat GRANS % 1 %      Lymphocytes Relative 32 %      Monocytes Relative 13 %      Eosinophils Relative 1 %      Basophils Relative 1 %      Neutrophils Absolute 4 01 Thousands/µL      Immature Grans Absolute 0 04 Thousand/uL      Lymphocytes Absolute 2 44 Thousands/µL      Monocytes Absolute 1 02 Thousand/µL      Eosinophils Absolute 0 10 Thousand/µL      Basophils Absolute 0 05 Thousands/µL                  CT head without contrast   ED Interpretation by Stacey Mosher MD (03/11 0153)   FINDINGS:     PARENCHYMA:  No intracranial mass, mass effect or midline shift  No CT signs of acute infarction  No acute parenchymal hemorrhage      VENTRICLES AND EXTRA-AXIAL SPACES:  Normal for the patient's age      VISUALIZED ORBITS: Normal visualized orbits      PARANASAL SINUSES: Normal visualized paranasal sinuses      CALVARIUM AND EXTRACRANIAL SOFT TISSUES:  Normal      IMPRESSION:     No acute intracranial abnormality                  Workstation performed: [id number]         Final Result by Alycia Hernandez MD (03/11 0150)      No acute intracranial abnormality  Workstation performed: [id number]         CTA dissection protocol chest abdomen pelvis w wo contrast   ED Interpretation by Stacey Mosher MD (03/11 0203)   FINDINGS:      AORTA:  There is no aortic dissection or intramural hematoma  There is no aortic aneurysm      CHEST     LUNGS:  4 mm right lower lobe nodule (series 304, 134) 5 mm meter nodule at the left upper lobe (series 304, image 72)     There is no tracheal or endobronchial lesion      PLEURA:  Unremarkable      HEART/PULMONARY ARTERIAL TREE:  Unremarkable for patient's age      MEDIASTINUM AND DIPAK:  Unremarkable      CHEST WALL AND LOWER NECK:   Unremarkable      ABDOMEN     LIVER/BILIARY TREE:  Unremarkable      GALLBLADDER:  No calcified gallstones  No pericholecystic inflammatory change      SPLEEN:  Unremarkable      PANCREAS:  Unremarkable      ADRENAL GLANDS:  Unremarkable      KIDNEYS/URETERS:  Unremarkable   No hydronephrosis      STOMACH AND BOWEL:  Unremarkable      APPENDIX:  No findings to suggest appendicitis      ABDOMINOPELVIC CAVITY:  No ascites or free intraperitoneal air  No lymphadenopathy      PELVIS     REPRODUCTIVE ORGANS:  The prostate is enlarged      URI   NARY BLADDER:  Unremarkable      ABDOMINAL WALL/INGUINAL REGIONS:  There is a small fat-containing umbilical hernia      OSSEOUS STRUCTURES:  No acute fracture or destructive osseous lesion      IMPRESSION:     No evidence of aortic aneurysm or dissection  No evidence of acute pathology throughout the chest, abdomen and pelvis                 Workstation performed: [id number]      Final Result by Patricia Huber MD (03/11 0157)      No evidence of aortic aneurysm or dissection  No evidence of acute pathology throughout the chest, abdomen and pelvis                  Workstation performed: [id number]         XR chest 1 view portable   ED Interpretation by Katarzyna Rao MD (03/10 4802)   No acute disease read by me                    Procedures  ECG 12 Lead Documentation Only    Date/Time: 3/10/2023 11:45 PM  Performed by: Katarzyna Rao MD  Authorized by: Katarzyna Rao MD     Indications / Diagnosis:  Chest pain  ECG reviewed by me, the ED Provider: yes    Patient location:  ED  Previous ECG:     Previous ECG:  Unavailable  Quality:     Tracing quality:  Limited by artifact  Rate:     ECG rate:  65    ECG rate assessment: normal    Rhythm:     Rhythm: sinus rhythm    Ectopy:     Ectopy: none    QRS:     QRS axis:  Normal    QRS intervals:  Normal  Conduction:     Conduction: normal    ST segments:     ST segments:  Normal  T waves:     T waves: normal      ECG 12 Lead Documentation Only    Date/Time: 3/11/2023 2:41 AM  Performed by: Katarzyna Rao MD  Authorized by: Katarzyna Rao MD     Indications / Diagnosis:  Repeat EKG for chest pain  ECG reviewed by me, the ED Provider: yes    Patient location:  ED  Previous ECG:     Previous ECG:  Compared to current    Comparison ECG info:  Earlier tonight    Similarity:  Changes noted (s  lauren now)  Rate:     ECG rate:  51    ECG rate assessment: bradycardic    Rhythm:     Rhythm: sinus bradycardia      Rhythm comment:  S  arrhythmia  Ectopy:     Ectopy: none    QRS:     QRS axis:  Normal    QRS intervals:  Normal  Conduction:     Conduction: normal    ST segments:     ST segments:  Normal  T waves:     T waves: normal               ED Course  ED Course as of 03/11/23 0252   Fri Mar 10, 2023   2348 EKG and CXR d/w patient and wife with patient's permission  Sat Mar 11, 2023   0043 Total CK(!): 460  IVFs ordered  0057 Potassium(!): 3 4  K-dur po ordered  2495 Labs d/w patient and wife  0244 CTs d/w patient and wife including patient can get f/u CT chest in 12 months for lung nodule                HEART Risk Score    Flowsheet Row Most Recent Value   Heart Score Risk Calculator    History 1 Filed at: 03/11/2023 0022   ECG 0 Filed at: 03/11/2023 0022   Age 1 Filed at: 03/11/2023 0022   Risk Factors 0 Filed at: 03/11/2023 0022   Troponin 0 Filed at: 03/11/2023 0022   HEART Score 2 Filed at: 03/11/2023 0022                PERC Rule for PE    Flowsheet Row Most Recent Value   PERC Rule for PE    Age >=50 1 Filed at: 03/11/2023 0114   HR >=100 --   O2 Sat on room air < 95% --   History of PE or DVT --   Recent trauma or surgery --   Hemoptysis --   Exogenous estrogen --   Unilateral leg swelling --   PERC Rule for PE Results 1 Filed at: 03/11/2023 0114                  Wells' Criteria for PE    Flowsheet Row Most Recent Value   Wells' Criteria for PE    Clinical signs and symptoms of DVT 0 Filed at: 03/11/2023 0114   PE is primary diagnosis or equally likely 3 Filed at: 03/11/2023 0114   HR >100 0 Filed at: 03/11/2023 0114   Immobilization at least 3 days or Surgery in the previous 4 weeks 0 Filed at: 03/11/2023 0114   Previous, objectively diagnosed PE or DVT 0 Filed at: 03/11/2023 0114   Hemoptysis 0 Filed at: 03/11/2023 0114   Malignancy with treatment within 6 months or palliative 0 Filed at: 03/11/2023 0114   Wells' Criteria Total 3 Filed at: 03/11/2023 0114                Medical Decision Making  DDX including but not limited to: ACS, MI, PE, PTX, pneumonia, dissection, pleurisy, pericarditis, myocarditis, rhabdomyolysis, GI etiology, anxiety  Amount and/or Complexity of Data Reviewed  Independent Historian: spouse  Labs: ordered  Decision-making details documented in ED Course  Radiology: ordered and independent interpretation performed  Decision-making details documented in ED Course  ECG/medicine tests: ordered and independent interpretation performed  Decision-making details documented in ED Course  Risk  Prescription drug management  Disposition  Final diagnoses:   Chest pain   Hyponatremia   Pulmonary nodule   Enlarged prostate   Elevated CK     Time reflects when diagnosis was documented in both MDM as applicable and the Disposition within this note     Time User Action Codes Description Comment    3/11/2023 12:14 AM Yobani Ponto Add [R07 9] Chest pain     3/11/2023 12:14 AM Yobani Ponto Add [E87 1] Hyponatremia     3/11/2023  2:04 AM Yobani Ponto Add [R91 1] Pulmonary nodule     3/11/2023  2:43 AM Yobani Ponto Add [N40 0] Enlarged prostate     3/11/2023  2:43 AM Yobani Ponto Modify [R07 9] Chest pain     3/11/2023  2:43 AM Yobani Ponto Modify [E87 1] Hyponatremia     3/11/2023  2:47 AM Yobani Ponto Add [R74 8] Elevated CK       ED Disposition     ED Disposition   Admit    Condition   Stable    Date/Time   Sat Mar 11, 2023  2:51 AM    Comment   Case was discussed with SABRA [place]  and the patient's admission status was agreed to be Admission Status: observation status to the service of Dr Paty Gatica   Follow-up Information    None         Patient's Medications   Discharge Prescriptions    No medications on file       No discharge procedures on file      PDMP Review       Value Time User    PDMP Reviewed Yes 3/10/2023 11:24 PM Ken Smith MD          ED Provider  Electronically Signed by           Ken Smith MD  03/11/23 3532

## 2023-03-11 NOTE — ASSESSMENT & PLAN NOTE
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

## 2023-03-11 NOTE — ASSESSMENT & PLAN NOTE
· Patient was noted to have one-time seizure that occurred in 2014  · Continue oxcarbazepine 600 mg twice daily    · Follow-up with PCP

## 2023-03-11 NOTE — ASSESSMENT & PLAN NOTE
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

## 2023-03-11 NOTE — ASSESSMENT & PLAN NOTE
· Patient was noted to have one-time seizure that occurred in 2014  · Continue oxcarbazepine 600 mg twice daily  · Oxcarbazepine level pending

## 2023-03-11 NOTE — ASSESSMENT & PLAN NOTE
· Patient follows with Dr Germaine Land of neurology as an outpatient  · Patient exhibited increased symptoms of compulsive lying about inconsequential events  · Continue outpatient follow-up

## 2023-03-12 VITALS
BODY MASS INDEX: 31.07 KG/M2 | TEMPERATURE: 97.7 F | OXYGEN SATURATION: 97 % | HEIGHT: 70 IN | WEIGHT: 217 LBS | HEART RATE: 69 BPM | DIASTOLIC BLOOD PRESSURE: 77 MMHG | RESPIRATION RATE: 17 BRPM | SYSTOLIC BLOOD PRESSURE: 130 MMHG

## 2023-03-12 LAB
ANION GAP SERPL CALCULATED.3IONS-SCNC: 6 MMOL/L (ref 4–13)
ANION GAP SERPL CALCULATED.3IONS-SCNC: 7 MMOL/L (ref 4–13)
BUN SERPL-MCNC: 12 MG/DL (ref 5–25)
BUN SERPL-MCNC: 14 MG/DL (ref 5–25)
CALCIUM SERPL-MCNC: 9 MG/DL (ref 8.4–10.2)
CALCIUM SERPL-MCNC: 9 MG/DL (ref 8.4–10.2)
CHLORIDE SERPL-SCNC: 100 MMOL/L (ref 96–108)
CHLORIDE SERPL-SCNC: 101 MMOL/L (ref 96–108)
CO2 SERPL-SCNC: 24 MMOL/L (ref 21–32)
CO2 SERPL-SCNC: 27 MMOL/L (ref 21–32)
CREAT SERPL-MCNC: 0.85 MG/DL (ref 0.6–1.3)
CREAT SERPL-MCNC: 0.99 MG/DL (ref 0.6–1.3)
GFR SERPL CREATININE-BSD FRML MDRD: 100 ML/MIN/1.73SQ M
GFR SERPL CREATININE-BSD FRML MDRD: 87 ML/MIN/1.73SQ M
GLUCOSE SERPL-MCNC: 112 MG/DL (ref 65–140)
GLUCOSE SERPL-MCNC: 113 MG/DL (ref 65–140)
POTASSIUM SERPL-SCNC: 3.8 MMOL/L (ref 3.5–5.3)
POTASSIUM SERPL-SCNC: 4 MMOL/L (ref 3.5–5.3)
SODIUM SERPL-SCNC: 132 MMOL/L (ref 135–147)
SODIUM SERPL-SCNC: 133 MMOL/L (ref 135–147)

## 2023-03-12 RX ORDER — SODIUM CHLORIDE 1000 MG
1 TABLET, SOLUBLE MISCELLANEOUS 2 TIMES DAILY WITH MEALS
Qty: 3 TABLET | Refills: 0 | Status: SHIPPED | OUTPATIENT
Start: 2023-03-12 | End: 2023-03-12 | Stop reason: SDUPTHER

## 2023-03-12 RX ORDER — SODIUM CHLORIDE 1000 MG
1 TABLET, SOLUBLE MISCELLANEOUS 2 TIMES DAILY WITH MEALS
Qty: 3 TABLET | Refills: 0 | Status: SHIPPED | OUTPATIENT
Start: 2023-03-12

## 2023-03-12 RX ADMIN — OXCARBAZEPINE 600 MG: 150 TABLET, FILM COATED ORAL at 08:53

## 2023-03-12 RX ADMIN — SODIUM CHLORIDE 1 G: 1 TABLET ORAL at 08:53

## 2023-03-12 NOTE — PROGRESS NOTES
NEPHROLOGY PROGRESS NOTE   Linda York 46 y o  male MRN: [id number]  Unit/Bed#: S -01 Encounter: [id number]  Reason for Consult: Hyponatremia    ASSESSMENT AND PLAN:  46 y o   male with PMH of seizure and cognitive behavior who was admitted to [place] [address] after presenting with chest pain  A renal consultation is requested today for assistance in the management of hyponatremia      PLAN     #Hyposomolar Hyponatremia   • Sodium on admission: 124  • Serum osm: 266 (low)  • Urine osm: Pending  • Urine Sodium: 26   • Sodium low in January to 133  • Etiology:  Multifactorial likely secondary to SIADH compensated by poor solute intake  • Plan:  • Off IVF  • Continue sodium chloride 1 g twice daily for 3 days after discharge   • Patient can follow-up with PCP or with nephrology, BMP in 1 week      #Epilepsy  • On Trileptal     #Chest pain  • Right-sided chest pain, resolved  • Management as per primary team     #Elevated CK  • CK has been elevated since 2015  • Consider medication induced     #Cognitive and behavioral changes  • Follow-up with neurology    Discussed with primary team, sodium improving  We agreed to discharge patient on sodium chloride tablets    SUBJECTIVE:  Patient seen and examined at bedside  No chest pain, shortness of breath, nausea, vomiting, abdominal pain or diarrhea       OBJECTIVE:  Current Weight: Weight - Scale: 98 4 kg (217 lb)  Vitals:    03/12/23 0830   BP: 130/77   Pulse: 69   Resp: 17   Temp: 97 7 °F (36 5 °C)   SpO2: 97%     No intake or output data in the 24 hours ending 03/12/23 1135  Wt Readings from Last 3 Encounters:   03/11/23 98 4 kg (217 lb)   01/05/23 98 4 kg (217 lb)   04/18/22 99 8 kg (220 lb)     Temp Readings from Last 3 Encounters:   03/12/23 97 7 °F (36 5 °C)   11/13/21 (!) 97 3 °F (36 3 °C)   01/14/15 98 2 °F (36 8 °C)     BP Readings from Last 3 Encounters:   03/12/23 130/77   01/05/23 130/69   04/18/22 145/83     Pulse Readings from Last 3 Encounters: 03/12/23 69   01/05/23 73   04/18/22 75        General:   no acute distress at this time  Skin:  No acute rash  Eyes:  No scleral icterus and noninjected  ENT:  mucous membranes moist  Neck:  no carotid bruits  Chest:  Clear to auscultation percussion, good respiratory effort, no use of accessory respiratory muscles  CVS:  Regular rate and rhythm without rub ,   Abdomen:  soft and nontender   Extremities: no significant lower extremity edema  Neuro:  No gross focality  Psych:  Alert , cooperative       Medications:    Current Facility-Administered Medications:   •  acetaminophen (TYLENOL) tablet 650 mg, 650 mg, Oral, Q6H PRN, Radha Later, CRNP  •  OXcarbazepine (TRILEPTAL) tablet 600 mg, 600 mg, Oral, Q12H GIANLUCA, Landy Marroquin, CRNP, 600 mg at 03/12/23 8118  •  sodium chloride tablet 1 g, 1 g, Oral, BID With Meals, Shawn Rasmussen MD, 1 g at 03/12/23 0853    Laboratory Results:  Results from last 7 days   Lab Units 03/12/23  0834 03/12/23  0006 03/11/23  1625 03/11/23  1059 03/11/23  0458 03/10/23  2341   WBC Thousand/uL  --   --   --   --   --  7 66   HEMOGLOBIN g/dL  --   --   --   --   --  13 7   HEMATOCRIT %  --   --   --   --   --  38 9   PLATELETS Thousands/uL  --   --   --   --   --  248   SODIUM mmol/L 133* 132* 129* 129* 128* 124*   POTASSIUM mmol/L 4 0 3 8 3 9 3 9 3 5 3 4*   CHLORIDE mmol/L 100 101 98 99 96 92*   CO2 mmol/L 27 24 25 24 23 25   BUN mg/dL 12 14 16 13 12 13   CREATININE mg/dL 0 99 0 85 0 97 0 99 0 82 0 92   CALCIUM mg/dL 9 0 9 0 8 8 8 8 8 9 9 2       CT head without contrast   ED Interpretation by Norma Venegas MD (03/11 0153)   FINDINGS:     PARENCHYMA:  No intracranial mass, mass effect or midline shift  No CT signs of acute infarction    No acute parenchymal hemorrhage      VENTRICLES AND EXTRA-AXIAL SPACES:  Normal for the patient's age      VISUALIZED ORBITS: Normal visualized orbits      PARANASAL SINUSES: Normal visualized paranasal sinuses      CALVARIUM AND EXTRACRANIAL SOFT TISSUES:  Normal      IMPRESSION:     No acute intracranial abnormality                  Workstation performed: [id number]         Final Result by Blair Olivier MD (03/11 0150)      No acute intracranial abnormality  Workstation performed: [id number]         CTA dissection protocol chest abdomen pelvis w wo contrast   ED Interpretation by Leyla Welch MD (03/11 0203)   FINDINGS:      AORTA:  There is no aortic dissection or intramural hematoma  There is no aortic aneurysm      CHEST     LUNGS:  4 mm right lower lobe nodule (series 304, 134) 5 mm meter nodule at the left upper lobe (series 304, image 72)     There is no tracheal or endobronchial lesion      PLEURA:  Unremarkable      HEART/PULMONARY ARTERIAL TREE:  Unremarkable for patient's age      MEDIASTINUM AND DIPAK:  Unremarkable      CHEST WALL AND LOWER NECK:   Unremarkable      ABDOMEN     LIVER/BILIARY TREE:  Unremarkable      GALLBLADDER:  No calcified gallstones  No pericholecystic inflammatory change      SPLEEN:  Unremarkable      PANCREAS:  Unremarkable      ADRENAL GLANDS:  Unremarkable      KIDNEYS/URETERS:  Unremarkable  No hydronephrosis      STOMACH AND BOWEL:  Unremarkable      APPENDIX:  No findings to suggest appendicitis      ABDOMINOPELVIC CAVITY:  No ascites or free intraperitoneal air  No lymphadenopathy      PELVIS     REPRODUCTIVE ORGANS:  The prostate is enlarged      URI   NARY BLADDER:  Unremarkable      ABDOMINAL WALL/INGUINAL REGIONS:  There is a small fat-containing umbilical hernia      OSSEOUS STRUCTURES:  No acute fracture or destructive osseous lesion      IMPRESSION:     No evidence of aortic aneurysm or dissection  No evidence of acute pathology throughout the chest, abdomen and pelvis                 Workstation performed: [id number]      Final Result by Blair Olivier MD (03/11 0157)      No evidence of aortic aneurysm or dissection    No evidence of acute pathology throughout the chest, abdomen and pelvis                  Workstation performed: [id number]         XR chest 1 view portable   ED Interpretation by Alexander Lisa MD (03/10 9977)   No acute disease read by me  Final Result by Jacobo Whittington MD (81/02 2476)      No acute cardiopulmonary disease  Workstation performed: [id number]             Portions of the record may have been created with voice recognition software  Occasional wrong word or "sound a like" substitutions may have occurred due to the inherent limitations of voice recognition software  Read the chart carefully and recognize, using context, where substitutions have occurred

## 2023-03-12 NOTE — DISCHARGE INSTR - AVS FIRST PAGE
Dear Radha Greco,     It was our pleasure to care for you here at [facility] [employer]  It is our hope that we were always able to exceed the expected standards for your care during your stay  You were hospitalized due to chest pain  You were cared for on the 3rd floor by Brigid Shepherd MD under the service of Blaise Mcclain MD with the [facility] who covers for your primary care physician (PCP), Nancy Pina PA-C, while you were hospitalized  If you have any questions or concerns related to this hospitalization, you may contact us at [phone]  For follow up as well as any medication refills, we recommend that you follow up with your primary care physician  A registered nurse will reach out to you by phone within a few days after your discharge to answer any additional questions that you may have after going home  However, at this time we provide for you here, the most important instructions / recommendations at discharge:     Notable Medication Adjustments -   Start taking sodium chloride 1 g tablets 2 times a day for 3 days  Get BMP in 3 days and follow-up with your PCP and nephrology  Testing Required after Discharge -   BMP in 3 days  Important follow up information -   Follow-up with your PCP in 3 days with BMP  Follow-up with nephrology outpatient  Other Instructions -   Call provider if you develop generalized weakness or confusion  Please review this entire after visit summary as additional general instructions including medication list, appointments, activity, diet, any pertinent wound care, and other additional recommendations from your care team that may be provided for you        Sincerely,     Brigid Shepherd MD

## 2023-03-12 NOTE — UTILIZATION REVIEW
Initial Clinical Review    Admission: Date/Time/Statement:   Admission Orders (From admission, onward)     Ordered        23 0252  Place in Observation  Once                   Orders Placed This Encounter   Procedures   • Place in Observation     Standing Status:   Standing     Number of Occurrences:   1     Order Specific Question:   Level of Care     Answer:   Med Surg [16]     ED Arrival Information     Expected   -    Arrival   3/10/2023 23:12    Acuity   Urgent            Means of arrival   Walk-In    Escorted by   Spouse    Service   Hospitalist    Admission type   Emergency            Arrival complaint   CHEST AND ARM PAIN (RIGHT SIDE)        Chief Complaint   Patient presents with   • Chest Pain     Pt reports dull right sided chest pain that radiates down right arm that started at work tonight  Pt took aspirin at home  No cardiac history     Initial Presentation:   45 y/o male with PMHx Epilepsy with single seizure and cognitive/behavior changes on Trilleptal -  presents to [facility] ED on 3/10/23 2nd dull, right-sided chest pain with radiation down right arm 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, a family friend's  yesterday and wife  recently admitted to the hospital   Reports that he took 2 baby aspirin at home  In ED - vs wnl  HR 67 EKG shows NSR without ST or T-wave changes  CT Head, Chest/ Abdomen/ Pelvis shows no acute abnormality  Labs:  Na 124 K+ 3 4    Troponin wnl  ED Tx: IVF NSS, KCL po, NTG paste  Placed in Observation 3/11/23 at 0252 2nd Chest Pain - ACS protoco, serial EKG + troponins;   Hyponatremia likeky multifactorial 2nd poor intake with component of SIADH in the settings of oxcarbazepine  -  IVF NSS, monitor Na+,  Renal Consult      3/11/23 RENAL:  ASSESSMENT AND PLAN:  46 y o   male with PMH of seizure and cognitive behavior who was admitted to [facility] after presenting with chest pain  A renal consultation is requested today for assistance in the management of hyponatremia      PLAN   #Hyposomolar Hyponatremia   • Sodium on admission: 124  • Serum osm: 266 (low)  • Urine osm: Pending  • Urine Sodium: 26   • Sodium low in January to 132  • Etiology: Multifactorial likely secondary to poor intake for the last few days with component of SIADH in the settings of oxcarbazepine  • Plan:  • Discontinue IV fluids  • Start sodium chloride 1 g bid   • Avoid overcorrection: no more than 6-8mEq in the next 24hr, goal </=134  • Please monitor UOP  • Will monitor labs, call renal if SNa+ <126 or >134     #Epilepsy - On Trileptal     #Chest pain  • Right-sided chest pain  #Elevated CK  • Elevated since 2014  • Last levels were 460  • Consider medication induced   #Cognitive and behavioral changes  • Follow-up with neurology           ED Triage Vitals   Temperature Pulse Respirations Blood Pressure SpO2   03/10/23 2322 03/10/23 2320 03/10/23 2320 03/10/23 2320 03/10/23 2320   97 7 °F (36 5 °C) 67 18 123/68 98 %      Temp Source Heart Rate Source Patient Position - Orthostatic VS BP Location FiO2 (%)   03/10/23 2322 03/10/23 2320 03/11/23 0200 03/10/23 2320 --   Oral Monitor Lying Right arm       Pain Score       03/10/23 2320       5          Wt Readings from Last 1 Encounters:   03/11/23 98 4 kg (217 lb)     Additional Vital Signs:         Pertinent Labs/Diagnostic Test Results:  3/10/23 - 12 LEAD EKG:  ECG rate:  65  Rhythm: sinus rhythm    Ectopy: none    QRS: axis:  Normal    QRS intervals:  Normal  Conduction: Normal    ST segments: Normal  T waves: Normal       CT head without contrast   No acute intracranial abnormality  CTA dissection protocol chest abdomen pelvis w wo contrast   No evidence of aortic aneurysm or dissection  No evidence of acute pathology throughout the chest, abdomen and pelvis      XR chest 1 view portable   No acute cardiopulmonary disease       Results from last 7 days   Lab Units 03/10/23  2341   WBC Thousand/uL 7 66   HEMOGLOBIN g/dL 13 7   HEMATOCRIT % 38 9   PLATELETS Thousands/uL 248   NEUTROS ABS Thousands/µL 4 01       Results from last 7 days   Lab Units 03/12/23  0006 03/11/23  1625 03/11/23  1059 03/11/23  0458 03/10/23  2341   SODIUM mmol/L 132* 129* 129* 128* 124*   POTASSIUM mmol/L 3 8 3 9 3 9 3 5 3 4*   CHLORIDE mmol/L 101 98 99 96 92*   CO2 mmol/L 24 25 24 23 25   ANION GAP mmol/L 7 6 6 9 7   BUN mg/dL 14 16 13 12 13   CREATININE mg/dL 0 85 0 97 0 99 0 82 0 92   EGFR ml/min/1 73sq m 100 89 87 101 95   CALCIUM mg/dL 9 0 8 8 8 8 8 9 9 2     Results from last 7 days   Lab Units 03/10/23  2341   AST U/L 19   ALT U/L 13   ALK PHOS U/L 59   TOTAL PROTEIN g/dL 6 8   ALBUMIN g/dL 4 5   TOTAL BILIRUBIN mg/dL 1 03*     Results from last 7 days   Lab Units 03/12/23  0006 03/11/23  1625 03/11/23  1059 03/11/23  0458 03/10/23  2341   GLUCOSE RANDOM mg/dL 112 113 125 107 99     Results from last 7 days   Lab Units 03/11/23  0153   OSMOLALITY, SERUM mmol/*     Results from last 7 days   Lab Units 03/10/23  2341   HEMOGLOBIN A1C % 5 3   EAG mg/dl 105     Results from last 7 days   Lab Units 03/10/23  2341   CK TOTAL U/L 460*   CK MB INDEX % 1 2   CK MB ng/mL 5 3     Results from last 7 days   Lab Units 03/11/23  0153 03/10/23  2341   HS TNI 0HR ng/L  --  3   HS TNI 2HR ng/L 3  --    HSTNI D2 ng/L 0  --      Results from last 7 days   Lab Units 03/10/23  2341   D-DIMER QUANTITATIVE ug/ml FEU 0 28     Results from last 7 days   Lab Units 03/10/23  2341   PROTIME seconds 14 7*   INR  1 13   PTT seconds 30     Results from last 7 days   Lab Units 03/10/23  2341   TSH 3RD GENERATON uIU/mL 1 344     Results from last 7 days   Lab Units 03/10/23  2341   LIPASE u/L 21       Results from last 7 days   Lab Units 03/11/23  1108 03/11/23  0153   OSMOLALITY, SERUM mmol/KG  --  266*   OSMO UR mmol/*  --      Results from last 7 days   Lab Units 03/11/23  1108   SODIUM UR  26 ED Treatment:   Medication Administration from 03/10/2023 2312 to 03/11/2023 0324       Date/Time Order Dose Route Action     03/10/2023 2342 EST nitroglycerin (NITRO-BID) 2 % TD ointment 0 5 inch 0 5 inch Topical Given     03/11/2023 0230 EST sodium chloride 0 9 % bolus 1,000 mL 0 mL Intravenous Stopped     03/11/2023 0051 EST sodium chloride 0 9 % bolus 1,000 mL 1,000 mL Intravenous New Bag     03/11/2023 0151 EST potassium chloride (K-DUR,KLOR-CON) CR tablet 20 mEq 20 mEq Oral Given     03/11/2023 0134 EST iohexol (OMNIPAQUE) 350 MG/ML injection (SINGLE-DOSE) 100 mL 100 mL Intravenous Given     Present on Admission:  • Single seizure ([facility] [address] )  • Cognitive and behavioral changes    Admitting Diagnosis: Hyponatremia [E87 1]  Chest pain [R07 9]  Enlarged prostate [N40 0]  Pulmonary nodule [R91 1]  Elevated CK [R74 8]    Age/Sex: 46 y o  male    Admission Orders:  Telemetry  ST Segment Monitoring  VS q4hrs  Nasal O2 at 2 L/min prn - Titrate SpO2 > 92 %  Continuous Pulse Oximetry  SCD  Up + OOB as tolerated  Diet Cardiovascular; Cardiac  I+O q shift  Daily weight  Repeat EKG prn chest pain  Serial HS Troponin q2hrs x 3    Scheduled Medications:  OXcarbazepine, 600 mg, Oral, Q12H [address]  sodium chloride, 1 g, Oral, BID With Meals    Continuous IV Infusions:  IVF sodium chloride 0 9 % infusion   Ordered Dose: 50 mL/hr Route: Intravenous Frequency: Continuous @ 50 mL/hr   Scheduled Start Date/Time: 03/11/23 0845 End Date/Time: 03/11/23 1144      PRN Meds:  acetaminophen, 650 mg, Oral, Q6H PRN    IP CONSULT TO NEPHROLOGY    Network Utilization Review Department  ATTENTION: Please call with any questions or concerns to [phone] and carefully listen to the prompts so that you are directed to the right person   All voicemails are confidential   [place] all requests for admission clinical reviews, approved or denied determinations and any other requests to dedicated fax number below belonging to the campus where the patient is receiving treatment   List of dedicated fax numbers for the Facilities:  [address] DENIALS (Administrative/Medical Necessity) [phone]   [address] (Maternity/NICU/Pediatrics) [phone]   [address] [phone]   [address] [phone]   [address] [place] [address] [facility] [phone]   [address] [facility] [address] Tavares [facility] [address] [facility] [phone]

## 2023-03-12 NOTE — DISCHARGE SUMMARY
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  Plan  Na: 133  Continue sodium chloride tablets 1g bid for 3 days  BMP in 3 days  Follow-up with your PCP in 3 days   follow-up with nephrology      Chest pain  Assessment & Plan  • Patient Presentation: Patient originally presented with complaints of dull, right-sided chest pain with radiation down right arm 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 and also family had a friend's    Patient reports that he took 2 baby aspirin at home  • Likely etiology: Stress/anxiety versus MSK  • CTA dissection C/A/P: "No evidence of aortic aneurysm or dissection   No evidence of acute pathology throughout the chest, abdomen and pelvis "  • SHERRON: 0  • Troponin: negative  • Initial EKG: NSR, no ischemic changes noted, heart rate 65  • Total cholesterol:221, LDL: 146  • HbA1C: 5 3%  Plan  Chest pain resolved today  No SOB or palpitations  Elevated CK  Assessment & Plan  · Present on admission, CK of 460  Plan  Follow-up with PCP    Cognitive and behavioral changes  Assessment & Plan  · Patient follows with Dr Milagros Lambert of neurology as an outpatient  · Patient exhibited increased symptoms of compulsive lying about inconsequential events  · Continue outpatient follow-up      Single seizure [facility])  Assessment & Plan  · Patient was noted to have one-time seizure that occurred in 2014  · Continue oxcarbazepine 600 mg twice daily  · Follow-up with PCP      Medical Problems     Resolved Problems  Date Reviewed: 3/12/2023   None       Discharging Resident: Aneta Sosa MD  Discharging Attending: Heriberto Alonzo MD  PCP: Nima Tomas PA-C  Admission Date:   Admission Orders (From admission, onward)     Ordered        03/11/23 0252  Place in Observation  Once                      Discharge Date: 03/12/23    Consultations During Hospital Stay:  · Nephrology     Procedures Performed:   · None    Significant Findings / Test Results:   · Sodium on admission 124  Incidental Findings:   · None    Test Results Pending at Discharge (will require follow up): · None     Outpatient Tests Requested:  · BMP in 3 days    Complications: None    Reason for Admission: Chest pain    Hospital Course:   Edmund Castaneda is a 46 y o  male patient who originally presented to the hospital on 3/10/2023 due to dull right-sided chest pain with radiation down right arm  In the ED patient's vitals were stable  Heart rate within normal range  CTA did not show any evidence of aneurysm or dissection  SHERRON score was 0  Troponins negative  EKG normal sinus rhythm, no ischemic changes  His chest pain resolved spontaneously following day  On admission patient's sodium was found to have 124  Serum osmolarity 266, urine osmolarity 161, urine sodium 26, TSH, cortisol normal   Etiology was thought to be multifactorial due to poor oral intake and SIADH in the setting of oxacarbazepine  Initially treated with IV fluids  Nephrology was consulted  Started on salt tablets 1 g twice daily  Patient's sodium levels improved up to 133  Patient was asymptomatic throughout the hospital course  Patient is being discharged on sodium chloride 1 g twice daily for 3 days  Recommend following up with a BMP in 3 days    Recommend following up with nephrology outpatient  Please see above list of diagnoses and related plan for additional information  Condition at Discharge: good    Discharge Day Visit / Exam:   Subjective:  Patient was seen and examined at bedside  Doing well  No complaints  Chest pain has resolved  No SOB or palpitations  Vital stable  No confusion  Having regular bowel movements  Vitals: Blood Pressure: 130/77 (03/12/23 0830)  Pulse: 69 (03/12/23 0830)  Temperature: 97 7 °F (36 5 °C) (03/12/23 0830)  Temp Source: Oral (03/11/23 0800)  Respirations: 17 (03/12/23 0830)  Height: 5' 10" (177 8 cm) (03/11/23 0347)  Weight - Scale: 98 4 kg (217 lb) (03/11/23 0347)  SpO2: 97 % (03/12/23 0830)  Exam:   Physical Exam  Constitutional:       General: He is not in acute distress  Appearance: He is well-developed  He is not ill-appearing or toxic-appearing  HENT:      Head: Normocephalic  Cardiovascular:      Rate and Rhythm: Normal rate and regular rhythm  Heart sounds: Normal heart sounds  Pulmonary:      Effort: Pulmonary effort is normal       Breath sounds: Normal breath sounds  Abdominal:      General: Bowel sounds are normal       Palpations: Abdomen is soft  Musculoskeletal:      Right lower leg: No edema  Left lower leg: No edema  Skin:     General: Skin is warm and dry  Capillary Refill: Capillary refill takes less than 2 seconds  Neurological:      General: No focal deficit present  Mental Status: He is alert and oriented to person, place, and time  Psychiatric:         Mood and Affect: Mood normal          Behavior: Behavior normal           Discussion with Family: Attempted to update  (wife) via phone  Left voicemail  Discharge instructions/Information to patient and family:   See after visit summary for information provided to patient and family        Provisions for Follow-Up Care:  See after visit summary for information related to follow-up care and any pertinent home health orders  Disposition:   Home    Planned Readmission: no    Discharge Medications:  See after visit summary for reconciled discharge medications provided to patient and/or family        **Please Note: This note may have been constructed using a voice recognition system**

## 2023-03-13 ENCOUNTER — TELEPHONE (OUTPATIENT)
Dept: NEPHROLOGY | Facility: CLINIC | Age: 53
End: 2023-03-13

## 2023-03-13 LAB
ATRIAL RATE: 51 BPM
ATRIAL RATE: 62 BPM
ATRIAL RATE: 65 BPM
P AXIS: 47 DEGREES
P AXIS: 47 DEGREES
PR INTERVAL: 158 MS
PR INTERVAL: 170 MS
PR INTERVAL: 174 MS
QRS AXIS: -12 DEGREES
QRS AXIS: 33 DEGREES
QRS AXIS: 41 DEGREES
QRSD INTERVAL: 104 MS
QRSD INTERVAL: 108 MS
QRSD INTERVAL: 108 MS
QT INTERVAL: 406 MS
QT INTERVAL: 424 MS
QT INTERVAL: 444 MS
QTC INTERVAL: 409 MS
QTC INTERVAL: 422 MS
QTC INTERVAL: 430 MS
T WAVE AXIS: -5 DEGREES
T WAVE AXIS: 23 DEGREES
T WAVE AXIS: 28 DEGREES
VENTRICULAR RATE: 51 BPM
VENTRICULAR RATE: 62 BPM
VENTRICULAR RATE: 65 BPM

## 2023-03-14 LAB — OXCARBAZEPINE SERPL-MCNC: 16 UG/ML (ref 10–35)

## 2023-03-20 NOTE — TELEPHONE ENCOUNTER
Called pt to see if they wanted to come in for his hosp f/u sooner  Ava Sue has availability tomorrow (3/21) in the EO

## 2023-03-29 ENCOUNTER — TELEPHONE (OUTPATIENT)
Dept: NEPHROLOGY | Facility: CLINIC | Age: 53
End: 2023-03-29

## 2023-03-30 ENCOUNTER — APPOINTMENT (OUTPATIENT)
Dept: LAB | Facility: MEDICAL CENTER | Age: 53
End: 2023-03-30

## 2023-03-30 DIAGNOSIS — R74.8 ELEVATED CK: ICD-10-CM

## 2023-03-30 DIAGNOSIS — Z11.59 ENCOUNTER FOR HEPATITIS C SCREENING TEST FOR LOW RISK PATIENT: ICD-10-CM

## 2023-03-30 DIAGNOSIS — E87.1 HYPONATREMIA: ICD-10-CM

## 2023-03-30 LAB
CK SERPL-CCNC: 265 U/L (ref 39–308)
HCV AB SER QL: NORMAL

## 2023-04-21 ENCOUNTER — APPOINTMENT (OUTPATIENT)
Dept: LAB | Facility: MEDICAL CENTER | Age: 53
End: 2023-04-21

## 2023-04-21 DIAGNOSIS — N40.0 BENIGN PROSTATIC HYPERPLASIA WITHOUT LOWER URINARY TRACT SYMPTOMS: ICD-10-CM

## 2023-04-21 DIAGNOSIS — E87.1 HYPONATREMIA: ICD-10-CM

## 2023-04-23 NOTE — PROGRESS NOTES
Assessment and Plan:  Hyponatremia, hypoosmolar:  -Etiology: Suspect multifactorial due to poor solute intake and ADH in the setting of oxcarbazepine  -Admission serum sodium 124 on 3/10/2023, discharge sodium 133 on 3/12/2023  -recent serum sodium 133, stable  -Baseline sodium appears to be 140-143 meq  -workup: serum osmolality 266, urine osmolality 161, urine sodium 26  -uric acid 3 2 near normal, am cortisol 20 8, TSH normal   -Imaging: CT head, chest, abdomen, pelvis without evidence of malignancy  -Patient discontinued salt tablets last week 1 day prior to lab draw  -recommend Fluid restriction of 1 5L/day  Patient currently not following fluid restriction  -Avoid NSAIDs, including ibuprofen  -Renal function normal, baseline creatinine 0 8-1 1  Most recent creatinine 1 04, GFR 82  Stable at baseline  -continue to monitor   -repeat BMP now to reassess sodium level  If stable will remain off salt tablets and continue follow-up  -follow-up in office in 4 months with Dr Jacki Sawant with repeat labs  Seizure disorder:  - Stable  -On Trileptal (oxcarbazepine)    Age related screening: Your primary caregiver may do yearly screening for colorectal cancer  It is recommended in all men and women over 48years old  You may have screening earlier if you have colon disease or a family history of colorectal cancer    Kwame Naik was seen today for follow-up and hyponatremia  Diagnoses and all orders for this visit:    Hyponatremia  -     Basic metabolic panel; Future  -     Magnesium; Future  -     Basic metabolic panel; Future  -     Sodium, urine, random; Future  -     Osmolality, urine; Future  -     Ambulatory Referral to Nephrology    Single seizure ([facility] [address] )        Repeat BMP in 1 month  Follow up with Dr Jacki Sawant in 4 months with repeat blood and urine studies  Please call the office with any questions or concerns        Reason for Visit: Follow-up and Hyponatremia    HPI: Prince Lambert is a 46 y o  male with hyponatremia, seizure and cognitive dysfunction who presents for follow up of hyponatremia  Patient recently  hospitalized at [facility] 3/10-3/12/2023 after presenting with right-sided chest pain with radiation to right upper extremity  CT imaging and cardiac enzymes negative  Admission serum sodium 124 with serum osmolality 266, urine osmolality 161 and urine sodium 26  Cortisol, uric acid and TSH normal   CT imaging with no evidence of malignancy  Patient discharge on salt tabs 1 g twice daily with serum sodium 133 on 3/12/2023  Most recent serum sodium stable 133 and creatinine 1 04 with GFR 82  Meredith Maiers However, patient discontinued salt tablets last week and is not following any fluid restriction  Patient denies recent hospitalizations or ER visits  Patient denies NSAID use  Patient denies nausea, vomiting, diarrhea, dyspnea, orthopnea, edema, hematuria or foamy urine  ROS: A complete review of systems was performed and was negative unless otherwise noted in the history of present illness  Allergies:   Patient has no known allergies  Medications:     Current Outpatient Medications:   •  OXcarbazepine (TRILEPTAL) 600 mg tablet, Take 1 tablet (600 mg total) by mouth every 12 (twelve) hours, Disp: 180 tablet, Rfl: 3  •  Flowflex COVID-19 Ag Home Test KIT, Use as directed (Patient not taking: Reported on 4/24/2023), Disp: , Rfl:   •  Ibuprofen 200 MG CAPS, Take 1 capsule by mouth as needed, Disp: , Rfl:   •  sodium chloride 1 g tablet, Take 1 tablet (1 g total) by mouth 2 (two) times a day with meals (Patient not taking: Reported on 4/24/2023), Disp: 3 tablet, Rfl: 0    History reviewed  No pertinent past medical history  History reviewed  No pertinent surgical history  Family History   Problem Relation Age of Onset   • No Known Problems Mother    • Frontotemporal dementia Father    • Alzheimer's disease Paternal Grandmother       reports that he has never smoked   He has never used smokeless tobacco  He "reports current alcohol use  He reports that he does not use drugs  Physical Exam:   Vitals:    04/24/23 1230   BP: 122/84   BP Location: Left arm   Patient Position: Sitting   Cuff Size: Large   Pulse: 63   Weight: 98 4 kg (217 lb)   Height: 5' 10\" (1 778 m)     Body mass index is 31 14 kg/m²  General:  Awake, alert, appears comfortable and in no acute distress  Nontoxic  Skin:  No rash, warm, good skin turgor   Eyes:  PERRL, EOMI, sclerae nonicteric   no periorbital edema   ENT:  Moist mucous membranes  Neck:  Trachea midline, symmetric  No JVD  No carotid bruits  Chest:  Clear to auscultation bilaterally without wheezes, crackles or rhonchi  CVS:  Regular rate and rhythm without murmur, gallop or rub  S1 and S2 identified and normal   No S3, S4    Abdomen:  Soft, nontender, nondistended without masses  Normal bowel sounds x 4 quadrants  No bruit  Extremities:  Warm, pink, motor and sensory intact and well perfused  No cyanosis, pallor  No BLE edema  Neuro:  Awake, alert, oriented x3  Grossly intact  Psych:  Appropriate affect  Mentating appropriately  Normal mental status exam      Procedure:  No results found for this or any previous visit  Lab Results   Component Value Date    GLUCOSE 113 04/29/2014    CALCIUM 9 6 04/21/2023     04/29/2014    K 3 8 04/21/2023    CO2 27 04/21/2023     04/21/2023    BUN 18 04/21/2023    CREATININE 1 04 04/21/2023       Results from last 7 days   Lab Units 04/21/23  0834   POTASSIUM mmol/L 3 8   CHLORIDE mmol/L 102   CO2 mmol/L 27   BUN mg/dL 18   CREATININE mg/dL 1 04   CALCIUM mg/dL 9 6       EMR, including Epic, Care Everywhere and outside scanned documents reviewed  I have personally reviewed the blood work as stated above and in my note     I have personally reviewed PCP, consultants and prior nephrology notes      "

## 2023-04-23 NOTE — PATIENT INSTRUCTIONS
Your recent serum sodium levels are stable at 133  Check labs this week to recheck sodium level  your kidney function remains stable  Most recent creatinine 1 04, stable at baseline and normal   We will continue routine surveillance as outlined below  Avoid all NSAIDs to include ibuprofen, Motrin, Aleve, Advil, Naproxen, Celebrex, Indomethacin, Toradol  Stay well hydrated     Continue all prescribed medications  Call if blood pressure consistently more than 140/90 or less than 110/50s  High and low blood pressures may affect your kidney function  Recommend low salt diet (2 gm sodium diet)  Follow up in 4 months with Dr Krish Florez with repeat labs prior to appointment  Please contact the office with new symptoms or concerns

## 2023-04-24 ENCOUNTER — OFFICE VISIT (OUTPATIENT)
Dept: NEPHROLOGY | Facility: CLINIC | Age: 53
End: 2023-04-24

## 2023-04-24 ENCOUNTER — TELEPHONE (OUTPATIENT)
Dept: NEUROLOGY | Facility: CLINIC | Age: 53
End: 2023-04-24

## 2023-04-24 VITALS
SYSTOLIC BLOOD PRESSURE: 122 MMHG | BODY MASS INDEX: 31.07 KG/M2 | WEIGHT: 217 LBS | DIASTOLIC BLOOD PRESSURE: 84 MMHG | HEART RATE: 63 BPM | HEIGHT: 70 IN

## 2023-04-24 DIAGNOSIS — E87.1 HYPONATREMIA: Primary | ICD-10-CM

## 2023-04-24 DIAGNOSIS — R56.9 SINGLE SEIZURE (HCC): ICD-10-CM

## 2023-04-24 NOTE — LETTER
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

## 2023-04-24 NOTE — TELEPHONE ENCOUNTER
----- Message from Silvia Duncan RN sent at 4/24/2023  8:10 AM EDT -----  Regarding: FW: Letter for DOT exam   Contact: [phone]    ----- Message -----  From: [place]  Sent: 4/21/2023   2:15 PM EDT  To: [facility]  Subject: Letter for DOT exam                              Hi Maryjane Fonseca! Thank you so much  I’m so sorry about this, but the year is wrong  (No judgment as I wrote 2021 last week lol)  I really appreciate all you guys do  Thank you

## 2023-04-24 NOTE — TELEPHONE ENCOUNTER
Lazara Points - patient requested letter at check out from last visit  I saw the letter in media dated 4/18/2022 and thought you had already written the letter  But now I notice (since patient sent [employer] message) the letter is from last year  I can update the letter for this year, but just wanted to be sure you are okay with me doing so  Please advise  Jd Mccray  to [facility] [place] [facility] (supporting Melissa Loo MD)      9:43 PM  I forgot to get my letter during my appointment since we had so many questions about my salt  (Thanks for answering all of those)  I see DOT doc in a couple weeks  They told me at checkout that it’d be in my chart in about 24-48 hours  Just wanted to send a message in case that got missed  Thanks for everything!   It’s really appreciated

## 2023-04-25 ENCOUNTER — TELEPHONE (OUTPATIENT)
Dept: PSYCHIATRY | Facility: CLINIC | Age: 53
End: 2023-04-25

## 2023-04-25 NOTE — TELEPHONE ENCOUNTER
Left a message to return call to schedule intake and testing appointment  No prior authorization is required based on members insurance with CPT codes use  Per [employer]

## 2023-04-26 ENCOUNTER — TELEPHONE (OUTPATIENT)
Dept: PSYCHIATRY | Facility: CLINIC | Age: 53
End: 2023-04-26

## 2023-04-26 LAB — MISCELLANEOUS LAB TEST RESULT: NORMAL

## 2023-05-15 ENCOUNTER — TELEPHONE (OUTPATIENT)
Dept: PSYCHIATRY | Facility: CLINIC | Age: 53
End: 2023-05-15

## 2023-05-15 ENCOUNTER — PATIENT MESSAGE (OUTPATIENT)
Dept: NEUROLOGY | Facility: CLINIC | Age: 53
End: 2023-05-15

## 2023-05-15 NOTE — TELEPHONE ENCOUNTER
Left message to call back in regards to scheduling intake and testing appt   As this is the third attempt to contact pt, we will have to close out the referral

## 2023-05-16 NOTE — PATIENT COMMUNICATION
Form received back  Faxed to number on form along with requested documentation  Notified patient  Copy placed at  to be scanned to chart

## 2023-05-17 ENCOUNTER — TELEPHONE (OUTPATIENT)
Dept: PSYCHIATRY | Facility: CLINIC | Age: 53
End: 2023-05-17

## 2023-05-20 ENCOUNTER — OFFICE VISIT (OUTPATIENT)
Dept: URGENT CARE | Facility: MEDICAL CENTER | Age: 53
End: 2023-05-20

## 2023-05-20 VITALS
OXYGEN SATURATION: 96 % | BODY MASS INDEX: 31.35 KG/M2 | RESPIRATION RATE: 18 BRPM | DIASTOLIC BLOOD PRESSURE: 79 MMHG | HEART RATE: 82 BPM | TEMPERATURE: 98.6 F | SYSTOLIC BLOOD PRESSURE: 134 MMHG | HEIGHT: 70 IN | WEIGHT: 219 LBS

## 2023-05-20 DIAGNOSIS — Z48.02 ENCOUNTER FOR REMOVAL OF SUTURES: Primary | ICD-10-CM

## 2023-05-20 NOTE — PROGRESS NOTES
[facility] [facility]        NAME: Chacorta Duran is a 46 y o  male  : 1970    MRN: [id number]  DATE: May 20, 2023  TIME: 5:03 PM    Assessment and Plan   Encounter for removal of sutures [Z48 02]  1  Encounter for removal of sutures              Patient Instructions     Suture removal  Follow up with PCP in 3-5 days  Proceed to  ER if symptoms worsen  Chief Complaint     Chief Complaint   Patient presents with   • Suture / Staple Removal     2 areas right arm S/P skin biopsies  History of Present Illness       59-year-old male who presents for suture removal   Patient states that he had biopsies on the right forearm done approximately 14 days ago and now needs the sutures removed and will not be able to make the appointment with dermatology  Denies any redness, pain, swelling, discharge  Review of Systems   Review of Systems   Constitutional: Negative  HENT: Negative  Eyes: Negative  Respiratory: Negative  Negative for apnea, cough, choking, chest tightness, shortness of breath, wheezing and stridor  Cardiovascular: Negative  Negative for chest pain  Skin: Positive for wound           Current Medications       Current Outpatient Medications:   •  OXcarbazepine (TRILEPTAL) 600 mg tablet, Take 1 tablet (600 mg total) by mouth every 12 (twelve) hours, Disp: 180 tablet, Rfl: 3  •  Flowflex COVID-19 Ag Home Test KIT, Use as directed (Patient not taking: Reported on 2023), Disp: , Rfl:   •  Ibuprofen 200 MG CAPS, Take 1 capsule by mouth as needed, Disp: , Rfl:   •  sodium chloride 1 g tablet, Take 1 tablet (1 g total) by mouth 2 (two) times a day with meals (Patient not taking: Reported on 2023), Disp: 3 tablet, Rfl: 0    Current Allergies     Allergies as of 2023   • (No Known Allergies)            The following portions of the patient's history were reviewed and updated as appropriate: allergies, current medications, past family history, past medical history, "past social history, past surgical history and problem list      No past medical history on file  No past surgical history on file  Family History   Problem Relation Age of Onset   • No Known Problems Mother    • Frontotemporal dementia Father    • Alzheimer's disease Paternal Grandmother          Medications have been verified  Objective   /79   Pulse 82   Temp 98 6 °F (37 °C)   Resp 18   Ht 5' 10\" (1 778 m)   Wt 99 3 kg (219 lb)   SpO2 96%   BMI 31 42 kg/m²        Physical Exam     Physical Exam  Constitutional:       General: He is not in acute distress  Appearance: Normal appearance  He is well-developed  He is not diaphoretic  HENT:      Head: Normocephalic and atraumatic  Cardiovascular:      Rate and Rhythm: Normal rate and regular rhythm  Heart sounds: Normal heart sounds  Pulmonary:      Effort: Pulmonary effort is normal  No respiratory distress  Breath sounds: Normal breath sounds  No wheezing or rales  Chest:      Chest wall: No tenderness  Musculoskeletal:      Cervical back: Normal range of motion and neck supple  Lymphadenopathy:      Cervical: No cervical adenopathy  Skin:         Neurological:      Mental Status: He is alert                     "

## 2023-05-24 ENCOUNTER — TELEPHONE (OUTPATIENT)
Dept: NEUROLOGY | Facility: CLINIC | Age: 53
End: 2023-05-24

## 2023-05-24 NOTE — TELEPHONE ENCOUNTER
Patient called and would like a call back once their paperwork is filled out and signed from messages dated 5/18/2023

## 2023-05-25 NOTE — TELEPHONE ENCOUNTER
Emilia Bryan - the letter is attached to the patients 5/18/2023 mychart message encounter  Please advise if you are agreeable to completion      Burt Cogan - can you assist?

## 2023-05-26 NOTE — TELEPHONE ENCOUNTER
Letter finalized and printed  Please fax/scan as appropriate and let patient know of the completion  Thanks!

## 2023-05-26 NOTE — TELEPHONE ENCOUNTER
I called the patient to see if he wanted to have to letter faxed and he stated no that they would prefer to pick it up as it needs to be included with the other paperwork they have  Copy of the signed letter was given to the  as the patient will be picking up in [place] today

## 2023-05-31 ENCOUNTER — APPOINTMENT (OUTPATIENT)
Dept: LAB | Facility: MEDICAL CENTER | Age: 53
End: 2023-05-31
Payer: COMMERCIAL

## 2023-05-31 DIAGNOSIS — E87.1 HYPONATREMIA: ICD-10-CM

## 2023-05-31 LAB
ANION GAP SERPL CALCULATED.3IONS-SCNC: 2 MMOL/L (ref 4–13)
BUN SERPL-MCNC: 20 MG/DL (ref 5–25)
CALCIUM SERPL-MCNC: 9.1 MG/DL (ref 8.3–10.1)
CHLORIDE SERPL-SCNC: 107 MMOL/L (ref 96–108)
CO2 SERPL-SCNC: 27 MMOL/L (ref 21–32)
CREAT SERPL-MCNC: 1.13 MG/DL (ref 0.6–1.3)
GFR SERPL CREATININE-BSD FRML MDRD: 74 ML/MIN/1.73SQ M
GLUCOSE P FAST SERPL-MCNC: 86 MG/DL (ref 65–99)
POTASSIUM SERPL-SCNC: 3.6 MMOL/L (ref 3.5–5.3)
SODIUM SERPL-SCNC: 136 MMOL/L (ref 135–147)

## 2023-05-31 PROCEDURE — 36415 COLL VENOUS BLD VENIPUNCTURE: CPT

## 2023-05-31 PROCEDURE — 80048 BASIC METABOLIC PNL TOTAL CA: CPT

## 2023-07-10 ENCOUNTER — TELEPHONE (OUTPATIENT)
Dept: NEUROLOGY | Facility: CLINIC | Age: 53
End: 2023-07-10

## 2023-07-10 NOTE — TELEPHONE ENCOUNTER
Called pt to let him know the updated letter for [employer] with Dr. Peggy Sandoval signature was scanned into his Mychart.

## 2023-08-09 ENCOUNTER — RA CDI HCC (OUTPATIENT)
Dept: OTHER | Facility: HOSPITAL | Age: 53
End: 2023-08-09

## 2023-08-09 NOTE — PROGRESS NOTES
[address] [facility] coding opportunities       Chart reviewed, no opportunity found: CHART REVIEWED, NO OPPORTUNITY FOUND        Patients Insurance        Commercial Insurance: Rolly Fischer

## 2023-08-15 ENCOUNTER — TELEPHONE (OUTPATIENT)
Dept: NEPHROLOGY | Facility: CLINIC | Age: 53
End: 2023-08-15

## 2023-10-23 ENCOUNTER — RA CDI HCC (OUTPATIENT)
Dept: OTHER | Facility: HOSPITAL | Age: 53
End: 2023-10-23

## 2023-10-23 NOTE — PROGRESS NOTES
[address] [facility] coding opportunities       Chart reviewed, no opportunity found: CHART REVIEWED, NO OPPORTUNITY FOUND        Patients Insurance        Commercial Insurance: Rolly Fischer

## 2023-10-30 ENCOUNTER — OFFICE VISIT (OUTPATIENT)
Dept: NEUROLOGY | Facility: CLINIC | Age: 53
End: 2023-10-30
Payer: COMMERCIAL

## 2023-10-30 VITALS
BODY MASS INDEX: 32.03 KG/M2 | DIASTOLIC BLOOD PRESSURE: 80 MMHG | SYSTOLIC BLOOD PRESSURE: 124 MMHG | WEIGHT: 223.7 LBS | HEIGHT: 70 IN | TEMPERATURE: 98.7 F | HEART RATE: 78 BPM | OXYGEN SATURATION: 98 %

## 2023-10-30 DIAGNOSIS — E87.1 HYPONATREMIA: ICD-10-CM

## 2023-10-30 DIAGNOSIS — G40.919 INTRACTABLE EPILEPSY WITHOUT STATUS EPILEPTICUS, UNSPECIFIED EPILEPSY TYPE (HCC): ICD-10-CM

## 2023-10-30 DIAGNOSIS — R56.9 SINGLE SEIZURE (HCC): Primary | ICD-10-CM

## 2023-10-30 PROCEDURE — 99214 OFFICE O/P EST MOD 30 MIN: CPT | Performed by: NURSE PRACTITIONER

## 2023-10-30 RX ORDER — OXCARBAZEPINE 600 MG/1
600 TABLET, FILM COATED ORAL EVERY 12 HOURS SCHEDULED
Qty: 180 TABLET | Refills: 3 | Status: SHIPPED | OUTPATIENT
Start: 2023-10-30

## 2023-10-30 NOTE — PATIENT INSTRUCTIONS
- Continue current dose of oxcarbazepine 600 mg twice per day  - Have blood work within the next few weeks  - Call the office with any seizures or concerns  - Follow up in 5 months with Dr Gerda Chery
Statement Selected

## 2023-10-30 NOTE — PROGRESS NOTES
Review of Systems   Constitutional:  Negative for appetite change, fatigue and fever. HENT: Negative. Negative for hearing loss, tinnitus, trouble swallowing and voice change. Eyes: Negative. Negative for photophobia, pain and visual disturbance. Respiratory: Negative. Negative for shortness of breath. Cardiovascular: Negative. Negative for palpitations. Gastrointestinal: Negative. Negative for nausea and vomiting. Endocrine: Negative. Negative for cold intolerance. Genitourinary: Negative. Negative for dysuria, frequency and urgency. Musculoskeletal:  Negative for back pain, gait problem, myalgias and neck pain. Skin: Negative. Negative for rash. Allergic/Immunologic: Negative. Neurological: Negative. Negative for dizziness, tremors, seizures, syncope, facial asymmetry, speech difficulty, weakness, light-headedness, numbness and headaches. Hematological: Negative. Does not bruise/bleed easily. Psychiatric/Behavioral: Negative. Negative for confusion, hallucinations and sleep disturbance. All other systems reviewed and are negative.

## 2023-10-30 NOTE — PROGRESS NOTES
Patient ID: Gary Fong is a 48 y.o. male with a single seizure in 2014, who is returning to Neurology office for follow up of his seizure. Assessment/Plan:    Single seizure [facility])  Patient with single seizure in 2014, continues on oxcarbazepine. Currently taking oxcarbazepine 600 mg BID without recurrence of seizures. There were issues with hyponatremia several months ago in the setting of poor PO intake. His most recent blood work was about 5 months ago and sodium had normalized. Denies any s/s of hyponatremia. His neurologic exam is non-focal at today's visit. Recommended patient continue current dose of oxcarbazepine 600 mg BID. Will check CBC, CMP, and oxcarbazepine level in the next few weeks. If there are any breakthrough seizures, issues or concerns he will call the office. Follow up in about 5 months with Dr Easton Azar. Hyponatremia  Likely multifactorial. Will repeat sodium level with upcoming blood work. He will Return in about 5 months (around 3/30/2024) for Follow up with Dr Easton Azar. Subjective:  Gary Fong is a 48 y.o. male with a single seizure in 2014, who is returning to Neurology office for follow up of his seizure. He was last seen in the office by Dr Easton Azar on 4/17/23. At that time, he continued to be seizure free on oxcarbazepine monotherapy. There was discussion of transitioning to an alternative medication due to recent hospitalization due to hyponatremia int he setting of poor PO intake but since it was likely multifactorial, recommended continuing this medication unchanged. If there was worsening of hyponatremia could consider transition to an alternative medication. Recommended 3 month follow up. Since his last visit, he continues to be seizure free on oxcarbazepine 600 mg twice per day. He denies any side effects or s/s of hyponatremia. Overall he has been doing well. Back to work. Working for [email or web address].      Current seizure medications:  - oxcarbazepine 600 mg BID  Other medications as per Epic. Latest Reference Range & Units 03/12/23 08:34 03/30/23 08:34 04/21/23 08:34 05/31/23 10:55   Sodium 135 - 147 mmol/L 133 (L) 131 (L) 133 (L) 136       Prior Seizure Medications: levetiracetam (mood changes)     I reviewed prior neurology notes, most recent labs, as documented in Epic/[facility], and summarized above. Objective:    Blood pressure 124/80, pulse 78, temperature 98.7 °F (37.1 °C), temperature source Temporal, height 5' 10" (1.778 m), weight 101 kg (223 lb 11.2 oz), SpO2 98 %. Physical Exam  No apparent distress. Appears well nourished. Mood appropriate for situation     Neurologic Exam  Mental status- alert and oriented to person, place, and time. Speech appropriate for conversation. Good attention and knowledge. Cranial Nerves- PERRL, EOMS normal, facial sensation and muscles symmetric, hearing intact bilaterally to finger rubs, tongue midline, palate rise symmetrical, shoulder shrug symmetrical.    Motor- No pronator drift. Appropriate strength. Moves all extremities freely. No tremor. Sensory-  Intact distally in all extremities to light touch. DTRs- 2+ and symmetric in all extremities. Gait- normal casual gait. Coordination- FNF intact. ROS:  Review of Systems   Constitutional:  Negative for appetite change, fatigue and fever. HENT: Negative. Negative for hearing loss, tinnitus, trouble swallowing and voice change. Eyes: Negative. Negative for photophobia, pain and visual disturbance. Respiratory: Negative. Negative for shortness of breath. Cardiovascular: Negative. Negative for palpitations. Gastrointestinal: Negative. Negative for nausea and vomiting. Endocrine: Negative. Negative for cold intolerance. Genitourinary: Negative. Negative for dysuria, frequency and urgency. Musculoskeletal:  Negative for back pain, gait problem, myalgias and neck pain. Skin: Negative. Negative for rash. Allergic/Immunologic: Negative. Neurological: Negative. Negative for dizziness, tremors, seizures, syncope, facial asymmetry, speech difficulty, weakness, light-headedness, numbness and headaches. Hematological: Negative. Does not bruise/bleed easily. Psychiatric/Behavioral: Negative. Negative for confusion, hallucinations and sleep disturbance. All other systems reviewed and are negative    ROS obtained by MA and reviewed by myself. This note may have been created using voice recognition software. There may be unintentional errors such as grammatical errors, spelling errors, or pronoun errors.

## 2023-10-30 NOTE — ASSESSMENT & PLAN NOTE
Patient with single seizure in 2014, continues on oxcarbazepine. Currently taking oxcarbazepine 600 mg BID without recurrence of seizures. There were issues with hyponatremia several months ago in the setting of poor PO intake. His most recent blood work was about 5 months ago and sodium had normalized. Denies any s/s of hyponatremia. His neurologic exam is non-focal at today's visit. Recommended patient continue current dose of oxcarbazepine 600 mg BID. Will check CBC, CMP, and oxcarbazepine level in the next few weeks. If there are any breakthrough seizures, issues or concerns he will call the office. Follow up in about 5 months with Dr Fuentes Dotson.

## 2023-11-27 ENCOUNTER — APPOINTMENT (OUTPATIENT)
Dept: LAB | Facility: MEDICAL CENTER | Age: 53
End: 2023-11-27
Payer: COMMERCIAL

## 2023-11-27 DIAGNOSIS — R56.9 SINGLE SEIZURE (HCC): ICD-10-CM

## 2023-11-27 LAB
ALBUMIN SERPL BCP-MCNC: 4.5 G/DL (ref 3.5–5)
ALP SERPL-CCNC: 64 U/L (ref 34–104)
ALT SERPL W P-5'-P-CCNC: 17 U/L (ref 7–52)
ANION GAP SERPL CALCULATED.3IONS-SCNC: 6 MMOL/L
AST SERPL W P-5'-P-CCNC: 16 U/L (ref 13–39)
BASOPHILS # BLD AUTO: 0.08 THOUSANDS/ÂΜL (ref 0–0.1)
BASOPHILS NFR BLD AUTO: 2 % (ref 0–1)
BILIRUB SERPL-MCNC: 0.51 MG/DL (ref 0.2–1)
BUN SERPL-MCNC: 17 MG/DL (ref 5–25)
CALCIUM SERPL-MCNC: 9 MG/DL (ref 8.4–10.2)
CHLORIDE SERPL-SCNC: 101 MMOL/L (ref 96–108)
CO2 SERPL-SCNC: 29 MMOL/L (ref 21–32)
CREAT SERPL-MCNC: 0.91 MG/DL (ref 0.6–1.3)
EOSINOPHIL # BLD AUTO: 0.18 THOUSAND/ÂΜL (ref 0–0.61)
EOSINOPHIL NFR BLD AUTO: 4 % (ref 0–6)
ERYTHROCYTE [DISTWIDTH] IN BLOOD BY AUTOMATED COUNT: 12.7 % (ref 11.6–15.1)
GFR SERPL CREATININE-BSD FRML MDRD: 95 ML/MIN/1.73SQ M
GLUCOSE P FAST SERPL-MCNC: 100 MG/DL (ref 65–99)
HCT VFR BLD AUTO: 43.9 % (ref 36.5–49.3)
HGB BLD-MCNC: 15.1 G/DL (ref 12–17)
IMM GRANULOCYTES # BLD AUTO: 0.01 THOUSAND/UL (ref 0–0.2)
IMM GRANULOCYTES NFR BLD AUTO: 0 % (ref 0–2)
LYMPHOCYTES # BLD AUTO: 1.62 THOUSANDS/ÂΜL (ref 0.6–4.47)
LYMPHOCYTES NFR BLD AUTO: 36 % (ref 14–44)
MCH RBC QN AUTO: 31.2 PG (ref 26.8–34.3)
MCHC RBC AUTO-ENTMCNC: 34.4 G/DL (ref 31.4–37.4)
MCV RBC AUTO: 91 FL (ref 82–98)
MONOCYTES # BLD AUTO: 0.49 THOUSAND/ÂΜL (ref 0.17–1.22)
MONOCYTES NFR BLD AUTO: 11 % (ref 4–12)
NEUTROPHILS # BLD AUTO: 2.15 THOUSANDS/ÂΜL (ref 1.85–7.62)
NEUTS SEG NFR BLD AUTO: 47 % (ref 43–75)
NRBC BLD AUTO-RTO: 0 /100 WBCS
PLATELET # BLD AUTO: 263 THOUSANDS/UL (ref 149–390)
PMV BLD AUTO: 10.1 FL (ref 8.9–12.7)
POTASSIUM SERPL-SCNC: 4 MMOL/L (ref 3.5–5.3)
PROT SERPL-MCNC: 6.8 G/DL (ref 6.4–8.4)
RBC # BLD AUTO: 4.84 MILLION/UL (ref 3.88–5.62)
SODIUM SERPL-SCNC: 136 MMOL/L (ref 135–147)
WBC # BLD AUTO: 4.53 THOUSAND/UL (ref 4.31–10.16)

## 2023-11-27 PROCEDURE — 85025 COMPLETE CBC W/AUTO DIFF WBC: CPT

## 2023-11-27 PROCEDURE — 36415 COLL VENOUS BLD VENIPUNCTURE: CPT

## 2023-11-27 PROCEDURE — 80183 DRUG SCRN QUANT OXCARBAZEPIN: CPT

## 2023-11-27 PROCEDURE — 80053 COMPREHEN METABOLIC PANEL: CPT

## 2023-11-30 LAB — OXCARBAZEPINE SERPL-MCNC: 13 UG/ML (ref 10–35)

## 2024-01-16 ENCOUNTER — TELEPHONE (OUTPATIENT)
Dept: NEUROLOGY | Facility: CLINIC | Age: 54
End: 2024-01-16

## 2024-01-16 NOTE — TELEPHONE ENCOUNTER
Left pt voicemail regarding rescheduling upcoming appt with Dr. Mooney on 3/11. Please reschedule / remove holds to schedule the new appt.

## 2024-02-02 ENCOUNTER — HOSPITAL ENCOUNTER (OUTPATIENT)
Dept: RADIOLOGY | Facility: MEDICAL CENTER | Age: 54
Discharge: HOME | End: 2024-02-02
Payer: COMMERCIAL

## 2024-02-02 DIAGNOSIS — R91.1 LUNG NODULE: ICD-10-CM

## 2024-02-02 PROCEDURE — 71250 CT THORAX DX C-: CPT

## 2024-03-21 ENCOUNTER — HOSPITAL ENCOUNTER (EMERGENCY)
Facility: HOSPITAL | Age: 54
Discharge: HOME | End: 2024-03-21
Attending: EMERGENCY MEDICINE
Payer: COMMERCIAL

## 2024-03-21 VITALS
DIASTOLIC BLOOD PRESSURE: 67 MMHG | OXYGEN SATURATION: 97 % | RESPIRATION RATE: 18 BRPM | WEIGHT: 232.37 LBS | HEART RATE: 64 BPM | BODY MASS INDEX: 33.34 KG/M2 | SYSTOLIC BLOOD PRESSURE: 119 MMHG | TEMPERATURE: 97.5 F

## 2024-03-21 DIAGNOSIS — E87.1 HYPONATREMIA: ICD-10-CM

## 2024-03-21 DIAGNOSIS — E87.6 HYPOKALEMIA: ICD-10-CM

## 2024-03-21 DIAGNOSIS — R42 DIZZINESS: Primary | ICD-10-CM

## 2024-03-21 LAB
ALBUMIN SERPL BCP-MCNC: 4.6 G/DL (ref 3.5–5)
ALP SERPL-CCNC: 71 U/L (ref 34–104)
ALT SERPL W P-5'-P-CCNC: 15 U/L (ref 7–52)
ANION GAP SERPL CALCULATED.3IONS-SCNC: 10 MMOL/L (ref 4–13)
ANION GAP SERPL CALCULATED.3IONS-SCNC: 6 MMOL/L (ref 4–13)
AST SERPL W P-5'-P-CCNC: 17 U/L (ref 13–39)
BASOPHILS # BLD AUTO: 0.04 THOUSANDS/ÂΜL (ref 0–0.1)
BASOPHILS NFR BLD AUTO: 1 % (ref 0–1)
BILIRUB SERPL-MCNC: 0.47 MG/DL (ref 0.2–1)
BUN SERPL-MCNC: 11 MG/DL (ref 5–25)
BUN SERPL-MCNC: 13 MG/DL (ref 5–25)
CALCIUM SERPL-MCNC: 8.6 MG/DL (ref 8.4–10.2)
CALCIUM SERPL-MCNC: 8.8 MG/DL (ref 8.4–10.2)
CHLORIDE SERPL-SCNC: 93 MMOL/L (ref 96–108)
CHLORIDE SERPL-SCNC: 96 MMOL/L (ref 96–108)
CO2 SERPL-SCNC: 24 MMOL/L (ref 21–32)
CO2 SERPL-SCNC: 25 MMOL/L (ref 21–32)
CREAT SERPL-MCNC: 0.75 MG/DL (ref 0.6–1.3)
CREAT SERPL-MCNC: 0.84 MG/DL (ref 0.6–1.3)
EOSINOPHIL # BLD AUTO: 0.16 THOUSAND/ÂΜL (ref 0–0.61)
EOSINOPHIL NFR BLD AUTO: 2 % (ref 0–6)
ERYTHROCYTE [DISTWIDTH] IN BLOOD BY AUTOMATED COUNT: 12.3 % (ref 11.6–15.1)
FLUAV RNA RESP QL NAA+PROBE: NEGATIVE
FLUBV RNA RESP QL NAA+PROBE: NEGATIVE
GFR SERPL CREATININE-BSD FRML MDRD: 104 ML/MIN/1.73SQ M
GFR SERPL CREATININE-BSD FRML MDRD: 99 ML/MIN/1.73SQ M
GLUCOSE SERPL-MCNC: 109 MG/DL (ref 65–140)
GLUCOSE SERPL-MCNC: 169 MG/DL (ref 65–140)
HCT VFR BLD AUTO: 39.5 % (ref 36.5–49.3)
HGB BLD-MCNC: 13.9 G/DL (ref 12–17)
IMM GRANULOCYTES # BLD AUTO: 0.02 THOUSAND/UL (ref 0–0.2)
IMM GRANULOCYTES NFR BLD AUTO: 0 % (ref 0–2)
LYMPHOCYTES # BLD AUTO: 2.53 THOUSANDS/ÂΜL (ref 0.6–4.47)
LYMPHOCYTES NFR BLD AUTO: 33 % (ref 14–44)
MAGNESIUM SERPL-MCNC: 1.8 MG/DL (ref 1.9–2.7)
MCH RBC QN AUTO: 31.4 PG (ref 26.8–34.3)
MCHC RBC AUTO-ENTMCNC: 35.2 G/DL (ref 31.4–37.4)
MCV RBC AUTO: 89 FL (ref 82–98)
MONOCYTES # BLD AUTO: 0.66 THOUSAND/ÂΜL (ref 0.17–1.22)
MONOCYTES NFR BLD AUTO: 9 % (ref 4–12)
NEUTROPHILS # BLD AUTO: 4.18 THOUSANDS/ÂΜL (ref 1.85–7.62)
NEUTS SEG NFR BLD AUTO: 55 % (ref 43–75)
NRBC BLD AUTO-RTO: 0 /100 WBCS
PLATELET # BLD AUTO: 230 THOUSANDS/UL (ref 149–390)
PMV BLD AUTO: 9.9 FL (ref 8.9–12.7)
POTASSIUM SERPL-SCNC: 3.3 MMOL/L (ref 3.5–5.3)
POTASSIUM SERPL-SCNC: 3.7 MMOL/L (ref 3.5–5.3)
PROT SERPL-MCNC: 6.8 G/DL (ref 6.4–8.4)
RBC # BLD AUTO: 4.43 MILLION/UL (ref 3.88–5.62)
RSV RNA RESP QL NAA+PROBE: NEGATIVE
SARS-COV-2 RNA RESP QL NAA+PROBE: NEGATIVE
SODIUM SERPL-SCNC: 127 MMOL/L (ref 135–147)
SODIUM SERPL-SCNC: 127 MMOL/L (ref 135–147)
WBC # BLD AUTO: 7.59 THOUSAND/UL (ref 4.31–10.16)

## 2024-03-21 PROCEDURE — 0241U HB NFCT DS VIR RESP RNA 4 TRGT: CPT | Performed by: EMERGENCY MEDICINE

## 2024-03-21 PROCEDURE — 80053 COMPREHEN METABOLIC PANEL: CPT | Performed by: EMERGENCY MEDICINE

## 2024-03-21 PROCEDURE — 36415 COLL VENOUS BLD VENIPUNCTURE: CPT | Performed by: EMERGENCY MEDICINE

## 2024-03-21 PROCEDURE — 80048 BASIC METABOLIC PNL TOTAL CA: CPT | Performed by: EMERGENCY MEDICINE

## 2024-03-21 PROCEDURE — 99284 EMERGENCY DEPT VISIT MOD MDM: CPT

## 2024-03-21 PROCEDURE — 93005 ELECTROCARDIOGRAM TRACING: CPT

## 2024-03-21 PROCEDURE — 83735 ASSAY OF MAGNESIUM: CPT | Performed by: EMERGENCY MEDICINE

## 2024-03-21 PROCEDURE — 96361 HYDRATE IV INFUSION ADD-ON: CPT

## 2024-03-21 PROCEDURE — 85025 COMPLETE CBC W/AUTO DIFF WBC: CPT | Performed by: EMERGENCY MEDICINE

## 2024-03-21 PROCEDURE — 96374 THER/PROPH/DIAG INJ IV PUSH: CPT

## 2024-03-21 PROCEDURE — 99285 EMERGENCY DEPT VISIT HI MDM: CPT | Performed by: EMERGENCY MEDICINE

## 2024-03-21 RX ORDER — ONDANSETRON 2 MG/ML
4 INJECTION INTRAMUSCULAR; INTRAVENOUS ONCE
Status: COMPLETED | OUTPATIENT
Start: 2024-03-21 | End: 2024-03-21

## 2024-03-21 RX ORDER — MECLIZINE HCL 12.5 MG/1
25 TABLET ORAL ONCE
Status: COMPLETED | OUTPATIENT
Start: 2024-03-21 | End: 2024-03-21

## 2024-03-21 RX ORDER — SODIUM CHLORIDE 9 MG/ML
500 INJECTION, SOLUTION INTRAVENOUS ONCE
Status: DISCONTINUED | OUTPATIENT
Start: 2024-03-21 | End: 2024-03-21

## 2024-03-21 RX ORDER — SODIUM CHLORIDE 1 G/1
1 TABLET ORAL 3 TIMES DAILY
Qty: 42 TABLET | Refills: 0 | Status: SHIPPED | OUTPATIENT
Start: 2024-03-21 | End: 2024-04-04

## 2024-03-21 RX ORDER — POTASSIUM CHLORIDE 20 MEQ/1
40 TABLET, EXTENDED RELEASE ORAL ONCE
Status: COMPLETED | OUTPATIENT
Start: 2024-03-21 | End: 2024-03-21

## 2024-03-21 RX ADMIN — SODIUM CHLORIDE 500 ML: 0.9 INJECTION, SOLUTION INTRAVENOUS at 21:24

## 2024-03-21 RX ADMIN — POTASSIUM CHLORIDE 40 MEQ: 1500 TABLET, EXTENDED RELEASE ORAL at 20:47

## 2024-03-21 RX ADMIN — ONDANSETRON 4 MG: 2 INJECTION INTRAMUSCULAR; INTRAVENOUS at 20:06

## 2024-03-21 RX ADMIN — MECLIZINE 25 MG: 12.5 TABLET ORAL at 20:06

## 2024-03-21 RX ADMIN — SODIUM CHLORIDE 1000 ML: 0.9 INJECTION, SOLUTION INTRAVENOUS at 20:07

## 2024-03-22 ENCOUNTER — TELEPHONE (OUTPATIENT)
Dept: NEUROLOGY | Facility: CLINIC | Age: 54
End: 2024-03-22

## 2024-03-22 LAB
ATRIAL RATE: 60 BPM
P AXIS: 40 DEGREES
PR INTERVAL: 166 MS
QRS AXIS: 31 DEGREES
QRSD INTERVAL: 102 MS
QT INTERVAL: 444 MS
QTC INTERVAL: 444 MS
T WAVE AXIS: 15 DEGREES
VENTRICULAR RATE: 60 BPM

## 2024-03-22 PROCEDURE — 93010 ELECTROCARDIOGRAM REPORT: CPT | Performed by: INTERNAL MEDICINE

## 2024-03-22 NOTE — ED PROVIDER NOTES
"History  Chief Complaint   Patient presents with    Dizziness       Pt reports dizziness that started suddenly this evening, with vomiting, denies chest pain or SOB. Pt reports right ear is clogged.      (Logan Manzo) Logan Manzo is a 53 y.o. male     They presented to the emergency department on March 21, 2024. Patient presents with:  Dizziness: Pt reports dizziness that started suddenly this evening, with vomiting, denies chest pain or SOB. Pt reports right ear is clogged.       The patient states that 2 hours ago he began experiencing \"dizziness\" along with associated nausea.  Patient states that he was manipulated by his chiropractor yesterday, had been doing well up until prior to developing symptoms.  Patient notes that he has worsening symptoms with movement of his head, denies any history of vertigo, denies any pain at this time.  Patient notes that while not moving he does not feel nauseous.  Patient notes that he feels as if his left ear is clogged, but states that his chiropractor was ill with a sinus infection.  Patient denies fever, chills, chest pain, difficulty breathing, abdominal pain, change in bowel habits, change in urination, or any other complaint at this time.              Prior to Admission Medications   Prescriptions Last Dose Informant Patient Reported? Taking?   OXcarbazepine (TRILEPTAL) 600 mg tablet   No No   Sig: Take 1 tablet (600 mg total) by mouth every 12 (twelve) hours      Facility-Administered Medications: None       History reviewed. No pertinent past medical history.    History reviewed. No pertinent surgical history.    Family History   Problem Relation Age of Onset    No Known Problems Mother     Frontotemporal dementia Father     Alzheimer's disease Paternal Grandmother      I have reviewed and agree with the history as documented.    E-Cigarette/Vaping    E-Cigarette Use Never User      E-Cigarette/Vaping Substances     Social History     Tobacco Use    " Smoking status: Never    Smokeless tobacco: Never   Vaping Use    Vaping status: Never Used   Substance Use Topics    Alcohol use: Yes     Comment: Have a beer occasionally    Drug use: Never        Review of Systems   Constitutional:  Negative for chills and fever.   HENT:  Negative for ear pain and sore throat.    Eyes:  Negative for pain and visual disturbance.   Respiratory:  Negative for cough and shortness of breath.    Cardiovascular:  Negative for chest pain and palpitations.   Gastrointestinal:  Positive for nausea and vomiting. Negative for abdominal pain.   Genitourinary:  Negative for dysuria and hematuria.   Musculoskeletal:  Negative for arthralgias and back pain.   Skin:  Negative for color change and rash.   Neurological:  Positive for dizziness. Negative for seizures and syncope.   All other systems reviewed and are negative.      Physical Exam  ED Triage Vitals   Temperature Pulse Respirations Blood Pressure SpO2   03/21/24 1927 03/21/24 1927 03/21/24 1927 03/21/24 1927 03/21/24 1927   97.5 °F (36.4 °C) 57 18 142/74 99 %      Temp Source Heart Rate Source Patient Position - Orthostatic VS BP Location FiO2 (%)   03/21/24 1927 03/21/24 1927 03/21/24 2200 03/21/24 2200 --   Oral Monitor Lying Right arm       Pain Score       03/21/24 1927       No Pain             Orthostatic Vital Signs  Vitals:    03/21/24 1927 03/21/24 1935 03/21/24 1945 03/21/24 2200   BP: 142/74 135/71 135/71 119/67   Pulse: 57 55 (!) 54 64   Patient Position - Orthostatic VS:    Lying       Physical Exam  Vitals and nursing note reviewed.   Constitutional:       General: He is not in acute distress.     Appearance: Normal appearance.   HENT:      Head: Normocephalic and atraumatic.      Right Ear: External ear normal.      Left Ear: External ear normal.      Nose: Nose normal.      Mouth/Throat:      Mouth: Mucous membranes are moist.   Eyes:      Extraocular Movements: Extraocular movements intact.      Conjunctiva/sclera:  Conjunctivae normal.      Pupils: Pupils are equal, round, and reactive to light.      Comments: Slight right-sided nystagmus   Cardiovascular:      Rate and Rhythm: Normal rate and regular rhythm.   Pulmonary:      Effort: Pulmonary effort is normal. No respiratory distress.      Breath sounds: Normal breath sounds.   Abdominal:      General: Abdomen is flat. Bowel sounds are normal.      Tenderness: There is no abdominal tenderness. There is no guarding or rebound.   Musculoskeletal:         General: Normal range of motion.      Cervical back: Normal range of motion.   Skin:     General: Skin is warm and dry.      Capillary Refill: Capillary refill takes less than 2 seconds.   Neurological:      Mental Status: He is alert. Mental status is at baseline.      Cranial Nerves: No cranial nerve deficit.      Sensory: No sensory deficit.      Motor: No weakness.      Coordination: Coordination normal.   Psychiatric:         Mood and Affect: Mood normal.         ED Medications  Medications   meclizine (ANTIVERT) tablet 25 mg (25 mg Oral Given 3/21/24 2006)   ondansetron (ZOFRAN) injection 4 mg (4 mg Intravenous Given 3/21/24 2006)   sodium chloride 0.9 % bolus 1,000 mL (0 mL Intravenous Stopped 3/21/24 2107)   potassium chloride (Klor-Con M20) CR tablet 40 mEq (40 mEq Oral Given 3/21/24 2047)   sodium chloride 0.9 % bolus 500 mL (0 mL Intravenous Stopped 3/21/24 2224)       Diagnostic Studies  Results Reviewed       Procedure Component Value Units Date/Time    Basic metabolic panel [[id number]]  (Abnormal) Collected: 03/21/24 2244    Lab Status: Final result Specimen: Blood from Arm, Right Updated: 03/21/24 2305     Sodium 127 mmol/L      Potassium 3.7 mmol/L      Chloride 96 mmol/L      CO2 25 mmol/L      ANION GAP 6 mmol/L      BUN 11 mg/dL      Creatinine 0.75 mg/dL      Glucose 109 mg/dL      Calcium 8.6 mg/dL      eGFR 104 ml/min/1.73sq m     Narrative:      National Kidney Disease Foundation guidelines for Chronic  Kidney Disease (CKD):     Stage 1 with normal or high GFR (GFR > 90 mL/min/1.73 square meters)    Stage 2 Mild CKD (GFR = 60-89 mL/min/1.73 square meters)    Stage 3A Moderate CKD (GFR = 45-59 mL/min/1.73 square meters)    Stage 3B Moderate CKD (GFR = 30-44 mL/min/1.73 square meters)    Stage 4 Severe CKD (GFR = 15-29 mL/min/1.73 square meters)    Stage 5 End Stage CKD (GFR <15 mL/min/1.73 square meters)  Note: GFR calculation is accurate only with a steady state creatinine    FLU/RSV/COVID - if FLU/RSV clinically relevant [[id number]]  (Normal) Collected: 03/21/24 2047    Lab Status: Final result Specimen: Nares from Nose Updated: 03/21/24 2131     SARS-CoV-2 Negative     INFLUENZA A PCR Negative     INFLUENZA B PCR Negative     RSV PCR Negative    Narrative:      FOR PEDIATRIC PATIENTS - copy/paste COVID Guidelines URL to browser: [email or web address]    SARS-CoV-2 assay is a Nucleic Acid Amplification assay intended for the  qualitative detection of nucleic acid from SARS-CoV-2 in nasopharyngeal  swabs. Results are for the presumptive identification of SARS-CoV-2 RNA.    Positive results are indicative of infection with SARS-CoV-2, the virus  causing COVID-19, but do not rule out bacterial infection or co-infection  with other viruses. Laboratories within the [place] and its  territories are required to report all positive results to the appropriate  public health authorities. Negative results do not preclude SARS-CoV-2  infection and should not be used as the sole basis for treatment or other  patient management decisions. Negative results must be combined with  clinical observations, patient history, and epidemiological information.  This test has not been FDA cleared or approved.    This test has been authorized by FDA under an Emergency Use Authorization  (EUA). This test is only authorized for the duration of time the  declaration that circumstances  exist justifying the authorization of the  emergency use of an in vitro diagnostic tests for detection of SARS-CoV-2  virus and/or diagnosis of COVID-19 infection under section 564(b)(1) of  the Act, 21 U.S.C. 360bbb-3(b)(1), unless the authorization is terminated  or revoked sooner. The test has been validated but independent review by FDA  and CLIA is pending.    Test performed using [employer] GeneXpert: This RT-PCR assay targets N2,  a region unique to SARS-CoV-2. A conserved region in the E-gene was chosen  for pan-Sarbecovirus detection which includes SARS-CoV-2.    According to CMS-2020-01-R, this platform meets the definition of high-throughput technology.    Magnesium [[id number]]  (Abnormal) Collected: 03/21/24 2006    Lab Status: Final result Specimen: Blood from Arm, Right Updated: 03/21/24 2051     Magnesium 1.8 mg/dL     Comprehensive metabolic panel [[id number]]  (Abnormal) Collected: 03/21/24 2006    Lab Status: Final result Specimen: Blood from Arm, Right Updated: 03/21/24 2037     Sodium 127 mmol/L      Potassium 3.3 mmol/L      Chloride 93 mmol/L      CO2 24 mmol/L      ANION GAP 10 mmol/L      BUN 13 mg/dL      Creatinine 0.84 mg/dL      Glucose 169 mg/dL      Calcium 8.8 mg/dL      AST 17 U/L      ALT 15 U/L      Alkaline Phosphatase 71 U/L      Total Protein 6.8 g/dL      Albumin 4.6 g/dL      Total Bilirubin 0.47 mg/dL      eGFR 99 ml/min/1.73sq m     Narrative:      National Kidney Disease Foundation guidelines for Chronic Kidney Disease (CKD):     Stage 1 with normal or high GFR (GFR > 90 mL/min/1.73 square meters)    Stage 2 Mild CKD (GFR = 60-89 mL/min/1.73 square meters)    Stage 3A Moderate CKD (GFR = 45-59 mL/min/1.73 square meters)    Stage 3B Moderate CKD (GFR = 30-44 mL/min/1.73 square meters)    Stage 4 Severe CKD (GFR = 15-29 mL/min/1.73 square meters)    Stage 5 End Stage CKD (GFR <15 mL/min/1.73 square meters)  Note: GFR calculation is accurate only with a steady state creatinine     CBC and differential [[id number]] Collected: 03/21/24 2006    Lab Status: Final result Specimen: Blood from Arm, Right Updated: 03/21/24 2021     WBC 7.59 Thousand/uL      RBC 4.43 Million/uL      Hemoglobin 13.9 g/dL      Hematocrit 39.5 %      MCV 89 fL      MCH 31.4 pg      MCHC 35.2 g/dL      RDW 12.3 %      MPV 9.9 fL      Platelets 230 Thousands/uL      nRBC 0 /100 WBCs      Neutrophils Relative 55 %      Immature Grans % 0 %      Lymphocytes Relative 33 %      Monocytes Relative 9 %      Eosinophils Relative 2 %      Basophils Relative 1 %      Neutrophils Absolute 4.18 Thousands/µL      Absolute Immature Grans 0.02 Thousand/uL      Absolute Lymphocytes 2.53 Thousands/µL      Absolute Monocytes 0.66 Thousand/µL      Eosinophils Absolute 0.16 Thousand/µL      Basophils Absolute 0.04 Thousands/µL                    No orders to display         Procedures  ECG 12 Lead Documentation Only    Date/Time: 3/21/2024 9:38 PM    Performed by: Jesus Thompson MD  Authorized by: Jesus Thompson MD    Indications / Diagnosis:  Dizziness  ECG reviewed by me, the ED Provider: yes    Patient location:  ED  Previous ECG:     Previous ECG:  Compared to current    Similarity:  No change  Interpretation:     Interpretation: normal    Rate:     ECG rate:  60    ECG rate assessment: normal    Rhythm:     Rhythm: sinus rhythm    Ectopy:     Ectopy: none    QRS:     QRS axis:  Normal    QRS intervals:  Normal  Conduction:     Conduction: normal    ST segments:     ST segments:  Normal  T waves:     T waves: inverted      Inverted:  III  Comments:      Normal sinus rhythm at 60 bpm, no PAC, no PVC, T wave inversion in lead III unchanged from previous EKG dated 3/11/2023 likely normal variant.        ED Course  ED Course as of 03/21/24 2342   u Mar 21, 2024   2139 Patient reevaluated, does not feel nauseous at this time.  Improvement with electrolyte repletion.  Remaining 250 cc bolus will be provided.  Anticipate discharge at  this time.                             SBIRT 22yo+      Flowsheet Row Most Recent Value   Initial Alcohol Screen: US AUDIT-C     1. How often do you have a drink containing alcohol? 0 Filed at: 03/21/2024 1934   2. How many drinks containing alcohol do you have on a typical day you are drinking?  0 Filed at: 03/21/2024 1934   3a. Male UNDER 65: How often do you have five or more drinks on one occasion? 0 Filed at: 03/21/2024 1934   3b. FEMALE Any Age, or MALE 65+: How often do you have 4 or more drinks on one occassion? 0 Filed at: 03/21/2024 1934   Audit-C Score 0 Filed at: 03/21/2024 1934   JEAN: How many times in the past year have you...    Used an illegal drug or used a prescription medication for non-medical reasons? Never Filed at: 03/21/2024 1934                  Medical Decision Making  53-year-old male presents emergency department with complaint of dizziness.  Differential diagnosis includes but is not limited to electrolyte abnormality, arrhythmia, flu, influenza, RSV.  Obtain blood work which showed evidence of hyponatremia as well as mild hypokalemia.  Patient has a history of hyponatremia secondary due to taking his Trileptal.  Repeat BMP showed improvement of potassium however sodium remained low.  Per review of patient's previous chart, prescription was sent for salt tabs.  Recommended to patient as well as wife at bedside concerning results to follow-up with nephrology. Patient appears well, nontoxic, agrees with plan of care at this time.  Answered all questions.  In light of this, patient would benefit from outpatient follow-up.    Amount and/or Complexity of Data Reviewed  Labs: ordered.    Risk  OTC drugs.  Prescription drug management.          Disposition  Final diagnoses:   Dizziness   Hyponatremia   Hypokalemia     Time reflects when diagnosis was documented in both MDM as applicable and the Disposition within this note       Time User Action Codes Description Comment    3/21/2024 10:06 PM  Jesus Thompson Add [R42] Dizziness     3/21/2024 10:06 PM Jesus Thompson Add [E87.1] Hyponatremia     3/21/2024 10:06 PM Jesus Thompson Add [E87.6] Hypokalemia           ED Disposition       ED Disposition   Discharge    Condition   Stable    Date/Time   Thu Mar 21, 2024 2206    Comment   Logan Manzo discharge to home/self care.                   Follow-up Information       Follow up With Specialties Details Why Contact Info    Millicent Peraza PA-C Internal Medicine, Physician Assistant   [address] [place] [address]  [phone]              Patient's Medications   Discharge Prescriptions    SODIUM CHLORIDE 1 G TABLET    Take 1 tablet (1 g total) by mouth 3 (three) times a day for 14 days       Start Date: 3/21/2024 End Date: 4/4/2024       Order Dose: 1 g       Quantity: 42 tablet    Refills: 0     No discharge procedures on file.    PDMP Review         Value Time User    PDMP Reviewed  Yes 3/10/2023 11:24 PM Himanshu Chavis MD             ED Provider  Attending physically available and evaluated Logan Manzo. I managed the patient along with the ED Attending.    Electronically Signed by           Jesus Thompson MD  03/21/24 8849

## 2024-03-22 NOTE — ED ATTENDING ATTESTATION
3/21/2024  I, Judd Campos MD, saw and evaluated the patient. I have discussed the patient with the resident/non-physician practitioner and agree with the resident's/non-physician practitioner's findings, Plan of Care, and MDM as documented in the resident's/non-physician practitioner's note, except where noted. All available labs and Radiology studies were reviewed.  I was present for key portions of any procedure(s) performed by the resident/non-physician practitioner and I was immediately available to provide assistance.       At this point I agree with the current assessment done in the Emergency Department.  I have conducted an independent evaluation of this patient a history and physical is as follows:    53-year-old male presented for evaluation of acute onset vertigo this afternoon.  He reports some associated nausea but no vomiting.  No visual disturbance.  Symptoms are worse with movement especially rotation of the head both ways and walking.  He feels off balance when he walks.  Symptoms do improve at rest.  No chest pain, shortness of breath, diaphoresis.  No injuries.  Denies history of similar symptoms.  He reports going to the chiropractor yesterday but did not have a neck adjustment.  States they only using an activator.    On exam here he is fairly comfortable at rest with head facing straight.  Symptoms much worse with quick rotation to either side of the head.  More slow movement results in the last symptoms.  He has some slight rightward nystagmus.  No vertical skew.  Coordination with finger-to-nose and heel-to-shin is normal.  Suspect a peripheral vertigo.  Plan symptomatic treatment, reevaluate.    ED Course  ED Course as of 03/21/24 2248   u Mar 21, 2024   2044 Sodium(!): 127   2044 Potassium(!): 3.3   2044 Chloride(!): 93   2044 Getting potassium repletion as well as normal saline.   2206 Patient reported symptomatic improvement with medication.  Will plan discharge home.  Likely  peripheral vertigo.  Negative viral testing.  Can continue symptomatic treatment at home as needed.   2247 Patient does have a history of hyponatremia likely related to Trileptal use.  Had followed with nephrology in the past on salt tablets.  Will recommend continued nephrology follow-up.         Critical Care Time  Procedures

## 2024-03-22 NOTE — DISCHARGE INSTRUCTIONS
Please follow up with your primary care provider concerning your visit today.  Please return to the Emergency Department for any other concerns.

## 2024-03-24 ENCOUNTER — OFFICE VISIT (OUTPATIENT)
Dept: URGENT CARE | Facility: MEDICAL CENTER | Age: 54
End: 2024-03-24
Payer: COMMERCIAL

## 2024-03-24 VITALS
RESPIRATION RATE: 20 BRPM | WEIGHT: 229 LBS | OXYGEN SATURATION: 97 % | TEMPERATURE: 99.1 F | SYSTOLIC BLOOD PRESSURE: 130 MMHG | HEART RATE: 80 BPM | DIASTOLIC BLOOD PRESSURE: 80 MMHG | BODY MASS INDEX: 32.86 KG/M2

## 2024-03-24 DIAGNOSIS — J01.90 ACUTE SINUSITIS, RECURRENCE NOT SPECIFIED, UNSPECIFIED LOCATION: Primary | ICD-10-CM

## 2024-03-24 PROCEDURE — 99213 OFFICE O/P EST LOW 20 MIN: CPT | Performed by: PHYSICIAN ASSISTANT

## 2024-03-24 RX ORDER — AMOXICILLIN AND CLAVULANATE POTASSIUM 875; 125 MG/1; MG/1
1 TABLET, FILM COATED ORAL EVERY 12 HOURS SCHEDULED
Qty: 14 TABLET | Refills: 0 | Status: SHIPPED | OUTPATIENT
Start: 2024-03-24 | End: 2024-03-31

## 2024-03-24 NOTE — PROGRESS NOTES
[facility]        NAME: Logan Manzo is a 53 y.o. male  : 1970    MRN: [id number]  DATE: 2024  TIME: 7:39 PM    Assessment and Plan   Acute sinusitis, recurrence not specified, unspecified location [J01.90]  1. Acute sinusitis, recurrence not specified, unspecified location  amoxicillin-clavulanate (AUGMENTIN) 875-125 mg per tablet            Patient Instructions       Follow up with PCP in 3-5 days.  Proceed to  ER if symptoms worsen.    If tests have been performed at [facility], our office will contact you with results if changes need to be made to the care plan discussed with you at the visit.  [facility]    Chief Complaint     Chief Complaint   Patient presents with    Ear Fullness     Right ear fullness x5 days    Sinus Problem     Sinus pressure ongoing for over a week; tylenol sinus taken today          History of Present Illness       She is here for evaluation of right ear fullness and sinus pressure for the past week.  Patient was recently in the ER for dizziness about 5 days ago.  He states that has resolved but he still has a lot of sinus pressure and congestion.  He is getting some thick yellow to white mucus out of the nose.  He denies any fevers or chills.    Ear Fullness   Associated symptoms include rhinorrhea. Pertinent negatives include no ear discharge, headaches or sore throat.   Sinus Problem  Associated symptoms include congestion, ear pain and sinus pressure. Pertinent negatives include no headaches or sore throat.       Review of Systems   Review of Systems   Constitutional: Negative.    HENT:  Positive for congestion, ear pain, postnasal drip, rhinorrhea and sinus pressure. Negative for ear discharge, sinus pain, sore throat and trouble swallowing.    Eyes: Negative.    Respiratory: Negative.     Cardiovascular: Negative.    Gastrointestinal: Negative.    Neurological:  Negative for dizziness, tremors, seizures,  syncope, facial asymmetry, speech difficulty, weakness, light-headedness, numbness and headaches.         Current Medications       Current Outpatient Medications:     amoxicillin-clavulanate (AUGMENTIN) 875-125 mg per tablet, Take 1 tablet by mouth every 12 (twelve) hours for 7 days, Disp: 14 tablet, Rfl: 0    OXcarbazepine (TRILEPTAL) 600 mg tablet, Take 1 tablet (600 mg total) by mouth every 12 (twelve) hours, Disp: 180 tablet, Rfl: 3    sodium chloride 1 g tablet, Take 1 tablet (1 g total) by mouth 3 (three) times a day for 14 days, Disp: 42 tablet, Rfl: 0    Current Allergies     Allergies as of 03/24/2024    (No Known Allergies)            The following portions of the patient's history were reviewed and updated as appropriate: allergies, current medications, past family history, past medical history, past social history, past surgical history and problem list.     History reviewed. No pertinent past medical history.    History reviewed. No pertinent surgical history.    Family History   Problem Relation Age of Onset    No Known Problems Mother     Frontotemporal dementia Father     Alzheimer's disease Paternal Grandmother          Medications have been verified.        Objective   /80   Pulse 80   Temp 99.1 °F (37.3 °C)   Resp 20   Wt 104 kg (229 lb)   SpO2 97%   BMI 32.86 kg/m²   No LMP for male patient.       Physical Exam     Physical Exam  Vitals and nursing note reviewed.   Constitutional:       General: He is not in acute distress.     Appearance: Normal appearance. He is well-developed. He is not ill-appearing, toxic-appearing or diaphoretic.   HENT:      Head: Normocephalic and atraumatic.      Right Ear: Ear canal normal. A middle ear effusion (Clear) is present. Tympanic membrane is bulging. Tympanic membrane is not perforated, erythematous or retracted.      Left Ear: Tympanic membrane and ear canal normal.      Nose: Congestion present.      Mouth/Throat:      Mouth: Mucous membranes  are moist.      Pharynx: No oropharyngeal exudate or posterior oropharyngeal erythema.   Eyes:      General:         Right eye: No discharge.         Left eye: No discharge.      Extraocular Movements: Extraocular movements intact.      Conjunctiva/sclera: Conjunctivae normal.      Pupils: Pupils are equal, round, and reactive to light.   Cardiovascular:      Rate and Rhythm: Normal rate and regular rhythm.   Pulmonary:      Effort: Pulmonary effort is normal. No respiratory distress.      Breath sounds: Normal breath sounds. No stridor. No wheezing, rhonchi or rales.   Lymphadenopathy:      Cervical: Cervical adenopathy present.   Skin:     General: Skin is warm and dry.   Neurological:      General: No focal deficit present.      Mental Status: He is alert and oriented to person, place, and time.   Psychiatric:         Mood and Affect: Mood normal.         Behavior: Behavior normal.         Thought Content: Thought content normal.         Judgment: Judgment normal.

## 2024-03-24 NOTE — PATIENT INSTRUCTIONS
Follow up with PCP in 3-5 days.    Proceed to  ER if symptoms worsen.    If tests have been performed at [facility], our office will contact you with results if changes need to be made to the care plan discussed with you at the visit.  You can review your full results on [facility] My[facility].     1.  Drink plenty fluids.    2.  Take probiotics [i.e. Yogurt, Acidophilus, Florastor (liquid)] daily.    3.  Over-the-counter medications as needed for symptomatic care.    4.   Advance activities as tolerated.    5.   Follow-up with your primary care physician in 3-4 days.    6.  Go to emergency room if symptoms are worsening.    7.  Use a humidifier at bedtime

## 2024-03-28 ENCOUNTER — APPOINTMENT (OUTPATIENT)
Dept: LAB | Facility: MEDICAL CENTER | Age: 54
End: 2024-03-28
Payer: COMMERCIAL

## 2024-03-28 ENCOUNTER — OFFICE VISIT (OUTPATIENT)
Dept: NEUROLOGY | Facility: CLINIC | Age: 54
End: 2024-03-28
Payer: COMMERCIAL

## 2024-03-28 VITALS
HEART RATE: 74 BPM | DIASTOLIC BLOOD PRESSURE: 77 MMHG | OXYGEN SATURATION: 96 % | HEIGHT: 70 IN | TEMPERATURE: 98.9 F | WEIGHT: 223.9 LBS | BODY MASS INDEX: 32.05 KG/M2 | SYSTOLIC BLOOD PRESSURE: 129 MMHG

## 2024-03-28 DIAGNOSIS — G40.919 INTRACTABLE EPILEPSY WITHOUT STATUS EPILEPTICUS, UNSPECIFIED EPILEPSY TYPE (HCC): ICD-10-CM

## 2024-03-28 DIAGNOSIS — E87.1 HYPONATREMIA: ICD-10-CM

## 2024-03-28 DIAGNOSIS — R56.9 SINGLE SEIZURE (HCC): ICD-10-CM

## 2024-03-28 DIAGNOSIS — E87.1 HYPONATREMIA: Primary | ICD-10-CM

## 2024-03-28 LAB
ANION GAP SERPL CALCULATED.3IONS-SCNC: 10 MMOL/L (ref 4–13)
BUN SERPL-MCNC: 13 MG/DL (ref 5–25)
CALCIUM SERPL-MCNC: 9.2 MG/DL (ref 8.4–10.2)
CHLORIDE SERPL-SCNC: 96 MMOL/L (ref 96–108)
CO2 SERPL-SCNC: 27 MMOL/L (ref 21–32)
CREAT SERPL-MCNC: 0.95 MG/DL (ref 0.6–1.3)
GFR SERPL CREATININE-BSD FRML MDRD: 91 ML/MIN/1.73SQ M
GLUCOSE P FAST SERPL-MCNC: 84 MG/DL (ref 65–99)
POTASSIUM SERPL-SCNC: 3.8 MMOL/L (ref 3.5–5.3)
SODIUM SERPL-SCNC: 133 MMOL/L (ref 135–147)

## 2024-03-28 PROCEDURE — 99214 OFFICE O/P EST MOD 30 MIN: CPT | Performed by: PSYCHIATRY & NEUROLOGY

## 2024-03-28 PROCEDURE — 36415 COLL VENOUS BLD VENIPUNCTURE: CPT

## 2024-03-28 PROCEDURE — 80048 BASIC METABOLIC PNL TOTAL CA: CPT

## 2024-03-28 RX ORDER — OXCARBAZEPINE 600 MG/1
600 TABLET, FILM COATED ORAL EVERY 12 HOURS SCHEDULED
Qty: 180 TABLET | Refills: 3 | Status: SHIPPED | OUTPATIENT
Start: 2024-03-28

## 2024-03-28 NOTE — PATIENT INSTRUCTIONS
-- continue to take Oxcarbazepine 600 mg twice a day.     -- Please get your blood work checked in about 1 month and then again about every 3 months going forward.

## 2024-03-28 NOTE — ASSESSMENT & PLAN NOTE
He continues to be seizure free since his initial seizure in April 2014. As noted previously, he has been doing very good and has been stable without any changes to his medications in about 10 years. 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 This has mainly been because his medications have remained stable and he has not had recurrent seizures.     At this point, I would recommend he continue his Oxcarbazepine unchanged. We discussed that as long as he is maintaining his current licenses, it would be best to keep his medications unchanged. Given the time period of his seizure freedom, I would support him continuing to drive and work, as he has been.     -- he will continue Oxcarbazepine 600 mg twice a day.

## 2024-03-28 NOTE — ASSESSMENT & PLAN NOTE
He was hyponatremic with a sodium of 127 when checked in the ED last week. He has had hyponatremia in the past, which can be exacerbated by Oxcarbazepine. That said, his sodium has typically been in the normal range. I suspect his most recent labs were a result of his recurrent vomiting. It will be important to recheck the results of his labs today and then recheck BMP about every 3 months going forward.

## 2024-03-28 NOTE — LETTER
3/28/2024     To Whom This May Concern,      Logan Manzo ( 1970) is under my professional care and was seen in my office for a single unprovoked seizure, which occurred on 2014. Mr. Manzo is currently taking Oxcarbazepine 1 Tablet (600 mg total) by mouth every 12 hours and the last medication change occurred in . He has not had any additional seizures since 2014. Mr. Manzo is cleared from a neurological perspective [place]      Sincerely,        James Mooney MD  [facility]

## 2024-03-28 NOTE — PROGRESS NOTES
Patient ID: Logan Manzo is a 53 y.o. male with single seizure in 2014, who is returning to Neurology office for follow up of his seizure.       Assessment/Plan:    Single seizure (HCC)  He continues to be seizure free since his initial seizure in April 2014. As noted previously, he has been doing very good and has been stable without any changes to his medications in about 10 years. 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 This has mainly been because his medications have remained stable and he has not had recurrent seizures.     At this point, I would recommend he continue his Oxcarbazepine unchanged. We discussed that as long as he is maintaining his current licenses, it would be best to keep his medications unchanged. Given the time period of his seizure freedom, I would support him continuing to drive and work, as he has been.     -- he will continue Oxcarbazepine 600 mg twice a day.     Hyponatremia  He was hyponatremic with a sodium of 127 when checked in the ED last week. He has had hyponatremia in the past, which can be exacerbated by Oxcarbazepine. That said, his sodium has typically been in the normal range. I suspect his most recent labs were a result of his recurrent vomiting. It will be important to recheck the results of his labs today and then recheck BMP about every 3 months going forward.         He will Return in about 6 months (around 9/28/2024).      Subjective:    HPI  Current seizure medications:  1. Oxcarbazepine 600 mg twice a day   Other medications as per Epic.    Since his last visit, he continues to be seizure free and has been tolerating Oxcarbazepine well without any side effects. As noted previously, his one and only seizure occurred on 4/25/2014 (nearly 10 years ago). He has been on Oxcarbazepine since that time without any problems.     He was recently in the ED due to recurrent nausea and vomiting. When in the ED, he had blood work, on  "which, his sodium was 127. He denies any symptoms or problems currently. His nausea/vomiting has resolved and he feels fully at his baseline. He got a repeat BMP this morning, but the results are still pending    Prior Seizure Medications: none    His history was also obtained from his wife, who was present at today's visit.     I reviewed notes and testing from his most recent ED stay, as documented in Epic/[employer], and summarized above.        Objective:    Blood pressure 129/77, pulse 74, temperature 98.9 °F (37.2 °C), temperature source Temporal, height 5' 10\" (1.778 m), weight 102 kg (223 lb 14.4 oz), SpO2 96%.    Physical Exam    Neurological Exam      ROS:    Review of Systems   Constitutional:  Negative for appetite change, fatigue and fever.   HENT: Negative.  Negative for hearing loss, tinnitus, trouble swallowing and voice change.    Eyes: Negative.  Negative for photophobia, pain and visual disturbance.   Respiratory: Negative.  Negative for shortness of breath.    Cardiovascular: Negative.  Negative for palpitations.   Gastrointestinal: Negative.  Negative for nausea and vomiting.   Endocrine: Negative.  Negative for cold intolerance.   Genitourinary: Negative.  Negative for dysuria, frequency and urgency.   Musculoskeletal:  Negative for back pain, gait problem, myalgias, neck pain and neck stiffness.   Skin: Negative.  Negative for rash.   Allergic/Immunologic: Negative.    Neurological: Negative.  Negative for dizziness, tremors, seizures, syncope, facial asymmetry, speech difficulty, weakness, light-headedness, numbness and headaches.   Hematological: Negative.  Does not bruise/bleed easily.   Psychiatric/Behavioral: Negative.  Negative for confusion, hallucinations and sleep disturbance.    All other systems reviewed and are negative.      I personally reviewed the ROS that was entered by the medical assistant    Voice recognition software was used in the generation of this note. There may " be unintentional errors including grammatical errors, spelling errors, or pronoun errors.

## 2024-05-28 ENCOUNTER — APPOINTMENT (OUTPATIENT)
Dept: LAB | Facility: MEDICAL CENTER | Age: 54
End: 2024-05-28
Payer: COMMERCIAL

## 2024-05-28 DIAGNOSIS — E87.1 HYPONATREMIA: ICD-10-CM

## 2024-05-28 LAB
ANION GAP SERPL CALCULATED.3IONS-SCNC: 9 MMOL/L (ref 4–13)
BUN SERPL-MCNC: 10 MG/DL (ref 5–25)
CALCIUM SERPL-MCNC: 8.9 MG/DL (ref 8.4–10.2)
CHLORIDE SERPL-SCNC: 102 MMOL/L (ref 96–108)
CO2 SERPL-SCNC: 27 MMOL/L (ref 21–32)
CREAT SERPL-MCNC: 0.84 MG/DL (ref 0.6–1.3)
GFR SERPL CREATININE-BSD FRML MDRD: 99 ML/MIN/1.73SQ M
GLUCOSE P FAST SERPL-MCNC: 118 MG/DL (ref 65–99)
POTASSIUM SERPL-SCNC: 3.8 MMOL/L (ref 3.5–5.3)
SODIUM SERPL-SCNC: 138 MMOL/L (ref 135–147)

## 2024-05-28 PROCEDURE — 36415 COLL VENOUS BLD VENIPUNCTURE: CPT

## 2024-05-28 PROCEDURE — 80048 BASIC METABOLIC PNL TOTAL CA: CPT

## 2024-06-07 ENCOUNTER — TELEPHONE (OUTPATIENT)
Dept: NEUROLOGY | Facility: CLINIC | Age: 54
End: 2024-06-07

## 2024-06-07 NOTE — TELEPHONE ENCOUNTER
Hello Good afternoon,       Patient has called back and re-scheduled to fist available Monday's as that's the only day he can be seen due to work.    Scheduled on 12/30/2024, 9:30am, [facility], and placed on the waiting list     Patient will either call back or send a InExchanget Message for refills and or concerns arise .    Thank you ,     Carole

## 2024-07-03 ENCOUNTER — TELEPHONE (OUTPATIENT)
Dept: PSYCHIATRY | Facility: CLINIC | Age: 54
End: 2024-07-03

## 2024-07-10 ENCOUNTER — APPOINTMENT (OUTPATIENT)
Dept: LAB | Facility: MEDICAL CENTER | Age: 54
End: 2024-07-10
Payer: COMMERCIAL

## 2024-07-10 DIAGNOSIS — E87.1 HYPONATREMIA: ICD-10-CM

## 2024-07-10 PROCEDURE — 36415 COLL VENOUS BLD VENIPUNCTURE: CPT

## 2024-07-10 PROCEDURE — 80048 BASIC METABOLIC PNL TOTAL CA: CPT

## 2024-07-11 LAB
ANION GAP SERPL CALCULATED.3IONS-SCNC: 17 MMOL/L (ref 4–13)
BUN SERPL-MCNC: 19 MG/DL (ref 5–25)
CALCIUM SERPL-MCNC: 9.6 MG/DL (ref 8.4–10.2)
CHLORIDE SERPL-SCNC: 94 MMOL/L (ref 96–108)
CO2 SERPL-SCNC: 26 MMOL/L (ref 21–32)
CREAT SERPL-MCNC: 1.51 MG/DL (ref 0.6–1.3)
GFR SERPL CREATININE-BSD FRML MDRD: 51 ML/MIN/1.73SQ M
GLUCOSE SERPL-MCNC: 84 MG/DL (ref 65–140)
POTASSIUM SERPL-SCNC: 3.8 MMOL/L (ref 3.5–5.3)
SODIUM SERPL-SCNC: 137 MMOL/L (ref 135–147)

## 2024-08-23 ENCOUNTER — APPOINTMENT (OUTPATIENT)
Dept: LAB | Facility: MEDICAL CENTER | Age: 54
End: 2024-08-23
Payer: COMMERCIAL

## 2024-08-23 DIAGNOSIS — N40.0 BENIGN PROSTATIC HYPERPLASIA WITHOUT LOWER URINARY TRACT SYMPTOMS: ICD-10-CM

## 2024-08-23 DIAGNOSIS — Z00.00 ROUTINE GENERAL MEDICAL EXAMINATION AT A HEALTH CARE FACILITY: ICD-10-CM

## 2024-08-23 DIAGNOSIS — Z12.5 SPECIAL SCREENING FOR MALIGNANT NEOPLASM OF PROSTATE: ICD-10-CM

## 2024-08-23 LAB
ALBUMIN SERPL BCG-MCNC: 4.5 G/DL (ref 3.5–5)
ALP SERPL-CCNC: 71 U/L (ref 34–104)
ALT SERPL W P-5'-P-CCNC: 19 U/L (ref 7–52)
ANION GAP SERPL CALCULATED.3IONS-SCNC: 8 MMOL/L (ref 4–13)
AST SERPL W P-5'-P-CCNC: 17 U/L (ref 13–39)
BILIRUB SERPL-MCNC: 0.4 MG/DL (ref 0.2–1)
BUN SERPL-MCNC: 18 MG/DL (ref 5–25)
CALCIUM SERPL-MCNC: 9.5 MG/DL (ref 8.4–10.2)
CHLORIDE SERPL-SCNC: 102 MMOL/L (ref 96–108)
CO2 SERPL-SCNC: 27 MMOL/L (ref 21–32)
CREAT SERPL-MCNC: 1.05 MG/DL (ref 0.6–1.3)
GFR SERPL CREATININE-BSD FRML MDRD: 80 ML/MIN/1.73SQ M
GLUCOSE P FAST SERPL-MCNC: 101 MG/DL (ref 65–99)
POTASSIUM SERPL-SCNC: 4.6 MMOL/L (ref 3.5–5.3)
PROT SERPL-MCNC: 7.2 G/DL (ref 6.4–8.4)
PSA SERPL-MCNC: 0.32 NG/ML (ref 0–4)
SODIUM SERPL-SCNC: 137 MMOL/L (ref 135–147)

## 2024-08-23 PROCEDURE — 80053 COMPREHEN METABOLIC PANEL: CPT

## 2024-08-23 PROCEDURE — G0103 PSA SCREENING: HCPCS

## 2024-08-23 PROCEDURE — 36415 COLL VENOUS BLD VENIPUNCTURE: CPT

## 2024-12-02 ENCOUNTER — OFFICE VISIT (OUTPATIENT)
Dept: CARDIOLOGY CLINIC | Facility: CLINIC | Age: 54
End: 2024-12-02
Payer: COMMERCIAL

## 2024-12-02 VITALS
WEIGHT: 227 LBS | SYSTOLIC BLOOD PRESSURE: 126 MMHG | HEIGHT: 70 IN | BODY MASS INDEX: 32.5 KG/M2 | OXYGEN SATURATION: 97 % | DIASTOLIC BLOOD PRESSURE: 80 MMHG | HEART RATE: 85 BPM | RESPIRATION RATE: 16 BRPM

## 2024-12-02 DIAGNOSIS — R07.9 CHEST PAIN IN ADULT: ICD-10-CM

## 2024-12-02 DIAGNOSIS — Z01.818 PRE-OP EXAM: Primary | ICD-10-CM

## 2024-12-02 PROCEDURE — 99204 OFFICE O/P NEW MOD 45 MIN: CPT | Performed by: INTERNAL MEDICINE

## 2024-12-02 PROCEDURE — 93000 ELECTROCARDIOGRAM COMPLETE: CPT | Performed by: INTERNAL MEDICINE

## 2024-12-02 NOTE — PROGRESS NOTES
Cardiology Consultation     Logan Manzo  [id number]  1970  [address] [facility] [place] [id number]    Impression:  Preop cardiovascular risk assessment prior to colonoscopy.  History of single seizure  Chest pain in adult      Plan:  HE now feels better.  Given h/o chest pain and releative lack of exercise will check a stress echo.   - he will work on diet and exercise.         The patient is a 54-year-old male with a history of a single seizure, initial April 2014.  He follows with neurology and neurosurgery.  He still drives with a CDL license.  He has been stable from a neurology standpoint for 10 years.  He was seen in the ED 3/21/2024 for dizziness, vomiting  and had hyponatremia with sodium of 127.  He has had hyponatremia in the past which can be expected by oxcarbazepine. HE had an er visit 3/12/2023 and c/o dull r sisded chest pain with radiation down the R arm. The R shoulder pain resolved in er with treatment. CTA- no dissection. Troponins negative. EKG nsr, no ischemic changes.     He now feels well. HE doesn't exercise much. HE is a . He walks a lot and carries heavy boxes with no symptoms. He doesn't smoke.     EKG today reveals normal sinus rhythm, possible LAE, borderline.        Patient Active Problem List   Diagnosis    Tiredness    Single seizure (HCC)    Cognitive and behavioral changes    Chest pain    Hyponatremia    Elevated CK     History reviewed. No pertinent past medical history.  Social History     Socioeconomic History    Marital status: /Civil Union     Spouse name: Not on file    Number of children: Not on file    Years of education: Not on file    Highest education level: Not on file   Occupational History    Not on file   Tobacco Use    Smoking status: Never    Smokeless tobacco: Never   Vaping Use    Vaping status: Never Used   Substance and  Sexual Activity    Alcohol use: Yes     Comment: Have a beer occasionally    Drug use: Never    Sexual activity: Yes     Partners: Female     Birth control/protection: Other   Other Topics Concern    Not on file   Social History Narrative    Not on file     Social Drivers of Health     Financial Resource Strain: Low Risk  (7/29/2024)    Received from [facility]    Overall Financial Resource Strain (CARDIA)     Difficulty of Paying Living Expenses: Not hard at all   Food Insecurity: No Food Insecurity (7/29/2024)    Received from [facility]    Hunger Vital Sign     Worried About Running Out of Food in the Last Year: Never true     Ran Out of Food in the Last Year: Never true   Transportation Needs: No Transportation Needs (7/29/2024)    Received from [facility]    PRAPARE - Transportation     Lack of Transportation (Medical): No     Lack of Transportation (Non-Medical): No   Physical Activity: Unknown (4/4/2023)    Received from [facility]    Exercise Vital Sign     Days of Exercise per Week: 0 days     Minutes of Exercise per Session: Not on file   Stress: No Stress Concern Present (7/29/2024)    Received from [facility]    [place] [employer] - Occupational Stress Questionnaire     Feeling of Stress : Not at all   Social Connections: Feeling Socially Integrated (7/29/2024)    Received from [facility]    OASIS : Social Isolation     How often do you feel lonely or isolated from those around you?: Never   Intimate Partner Violence: Not At Risk (7/29/2024)    Received from [facility]    Humiliation, Afraid, Rape, and Kick questionnaire     Fear of Current or Ex-Partner: No     Emotionally Abused: No     Physically Abused: No     Sexually Abused: No   Housing Stability: Low Risk  (7/29/2024)    Received from [facility]    Housing Stability Vital Sign  "    Unable to Pay for Housing in the Last Year: No     Number of Times Moved in the Last Year: 0     Homeless in the Last Year: No      Family History   Problem Relation Age of Onset    No Known Problems Mother     Frontotemporal dementia Father     Alzheimer's disease Paternal Grandmother      History reviewed. No pertinent surgical history.    Current Outpatient Medications:     OXcarbazepine (TRILEPTAL) 600 mg tablet, Take 1 tablet (600 mg total) by mouth every 12 (twelve) hours, Disp: 180 tablet, Rfl: 3  No Known Allergies  Vitals:    12/02/24 0942   BP: 126/80   BP Location: Left arm   Patient Position: Sitting   Cuff Size: Standard   Pulse: 85   Resp: 16   SpO2: 97%   Weight: 103 kg (227 lb)   Height: 5' 10\" (1.778 m)       Labs:  Transcribe Orders on 08/23/2024   Component Date Value    WBC 08/23/2024 6.05     RBC 08/23/2024 4.77     Hemoglobin 08/23/2024 14.8     Hematocrit 08/23/2024 44.7     MCV 08/23/2024 94     MCH 08/23/2024 31.0     MCHC 08/23/2024 33.1     RDW 08/23/2024 12.4     MPV 08/23/2024 10.5     Platelets 08/23/2024 251     nRBC 08/23/2024 0     Segmented % 08/23/2024 50     Immature Grans % 08/23/2024 0     Lymphocytes % 08/23/2024 35     Monocytes % 08/23/2024 10     Eosinophils Relative 08/23/2024 4     Basophils Relative 08/23/2024 1     Absolute Neutrophils 08/23/2024 3.05     Absolute Immature Grans 08/23/2024 0.02     Absolute Lymphocytes 08/23/2024 2.09     Absolute Monocytes 08/23/2024 0.61     Eosinophils Absolute 08/23/2024 0.22     Basophils Absolute 08/23/2024 0.06     Cholesterol 08/23/2024 230 (H)     Triglycerides 08/23/2024 214 (H)     HDL, Direct 08/23/2024 40     LDL Calculated 08/23/2024 147 (H)     Non-HDL-Chol (CHOL-HDL) 08/23/2024 190     TSH 3RD GENERATON 08/23/2024 0.989    Appointment on 08/23/2024   Component Date Value    Sodium 08/23/2024 137     Potassium 08/23/2024 4.6     Chloride 08/23/2024 102     CO2 08/23/2024 27     ANION GAP 08/23/2024 8     BUN 08/23/2024 " 18     Creatinine 08/23/2024 1.05     Glucose, Fasting 08/23/2024 101 (H)     Calcium 08/23/2024 9.5     AST 08/23/2024 17     ALT 08/23/2024 19     Alkaline Phosphatase 08/23/2024 71     Total Protein 08/23/2024 7.2     Albumin 08/23/2024 4.5     Total Bilirubin 08/23/2024 0.40     eGFR 08/23/2024 80     PSA 08/23/2024 0.318    Appointment on 07/10/2024   Component Date Value    Sodium 07/10/2024 137     Potassium 07/10/2024 3.8     Chloride 07/10/2024 94 (L)     CO2 07/10/2024 26     ANION GAP 07/10/2024 17 (H)     BUN 07/10/2024 19     Creatinine 07/10/2024 1.51 (H)     Glucose 07/10/2024 84     Calcium 07/10/2024 9.6     eGFR 07/10/2024 51      Imaging: No results found.    Review of Systems:  Review of Systems negative except for HPI    Physical Exam:  Physical Exam  GEN: Alert and oriented x 3, in no acute distress.  Well appearing and well nourished.   HEENT: Sclera anicteric, conjunctivae pink, mucous membranes moist. Oropharynx clear.   NECK: Supple, no carotid bruits, no significant JVD. Trachea midline, no thyromegaly.   HEART: Regular rhythm, normal S1 and S2, no murmurs, clicks, gallops or rubs. PMI nondisplaced, no thrills.   LUNGS: Clear to auscultation bilaterally; no wheezes, rales, or rhonchi. No increased work of breathing or signs of respiratory distress.   ABDOMEN: Soft, nontender, nondistended, normoactive bowel sounds.   EXTREMITIES: Skin warm and well perfused, no clubbing, cyanosis, or edema.  NEURO: No focal findings. Normal speech. Mood and affect normal.   SKIN: Normal without suspicious lesions on exposed skin.       1. Pre-op exam  POCT ECG

## 2024-12-19 ENCOUNTER — TELEPHONE (OUTPATIENT)
Age: 54
End: 2024-12-19

## 2024-12-19 DIAGNOSIS — R56.9 SINGLE SEIZURE (HCC): Primary | ICD-10-CM

## 2024-12-19 NOTE — TELEPHONE ENCOUNTER
Inbound call received from Patient requesting labs be ordered for him to get completed before his next appointment with Dr. Mooney on 12/30/2024. Patient said Dr. Mooney usually has standing labs for him to do and is specifically watching his sodium levels. Patient plans to go to a [facility] location for blood work.     Patient confirmed [phone] is the best phone number for call back and we may leave a detailed voicemail if needed.     Dr. Mooney please order blood work labs if desired. Patient has appointment 12/30/2024.Thank you!

## 2024-12-26 ENCOUNTER — APPOINTMENT (OUTPATIENT)
Dept: LAB | Facility: MEDICAL CENTER | Age: 54
End: 2024-12-26
Payer: COMMERCIAL

## 2024-12-26 DIAGNOSIS — R56.9 SINGLE SEIZURE (HCC): ICD-10-CM

## 2024-12-26 LAB
ALBUMIN SERPL BCG-MCNC: 4.9 G/DL (ref 3.5–5)
ALP SERPL-CCNC: 65 U/L (ref 34–104)
ALT SERPL W P-5'-P-CCNC: 20 U/L (ref 7–52)
ANION GAP SERPL CALCULATED.3IONS-SCNC: 6 MMOL/L (ref 4–13)
AST SERPL W P-5'-P-CCNC: 19 U/L (ref 13–39)
BILIRUB SERPL-MCNC: 0.55 MG/DL (ref 0.2–1)
BUN SERPL-MCNC: 12 MG/DL (ref 5–25)
CALCIUM SERPL-MCNC: 9.9 MG/DL (ref 8.4–10.2)
CHLORIDE SERPL-SCNC: 96 MMOL/L (ref 96–108)
CO2 SERPL-SCNC: 32 MMOL/L (ref 21–32)
CREAT SERPL-MCNC: 1 MG/DL (ref 0.6–1.3)
ERYTHROCYTE [DISTWIDTH] IN BLOOD BY AUTOMATED COUNT: 12.6 % (ref 11.6–15.1)
GFR SERPL CREATININE-BSD FRML MDRD: 84 ML/MIN/1.73SQ M
GLUCOSE P FAST SERPL-MCNC: 102 MG/DL (ref 65–99)
HCT VFR BLD AUTO: 45.9 % (ref 36.5–49.3)
HGB BLD-MCNC: 15.3 G/DL (ref 12–17)
MCH RBC QN AUTO: 31.7 PG (ref 26.8–34.3)
MCHC RBC AUTO-ENTMCNC: 33.3 G/DL (ref 31.4–37.4)
MCV RBC AUTO: 95 FL (ref 82–98)
PLATELET # BLD AUTO: 310 THOUSANDS/UL (ref 149–390)
PMV BLD AUTO: 9.8 FL (ref 8.9–12.7)
POTASSIUM SERPL-SCNC: 4.6 MMOL/L (ref 3.5–5.3)
PROT SERPL-MCNC: 7.2 G/DL (ref 6.4–8.4)
RBC # BLD AUTO: 4.82 MILLION/UL (ref 3.88–5.62)
SODIUM SERPL-SCNC: 134 MMOL/L (ref 135–147)
WBC # BLD AUTO: 5.23 THOUSAND/UL (ref 4.31–10.16)

## 2024-12-26 PROCEDURE — 80183 DRUG SCRN QUANT OXCARBAZEPIN: CPT

## 2024-12-26 PROCEDURE — 36415 COLL VENOUS BLD VENIPUNCTURE: CPT

## 2024-12-26 PROCEDURE — 80053 COMPREHEN METABOLIC PANEL: CPT

## 2024-12-26 PROCEDURE — 85027 COMPLETE CBC AUTOMATED: CPT

## 2024-12-30 ENCOUNTER — HOSPITAL ENCOUNTER (OUTPATIENT)
Dept: NON INVASIVE DIAGNOSTICS | Facility: CLINIC | Age: 54
Discharge: HOME | End: 2024-12-30

## 2024-12-30 ENCOUNTER — OFFICE VISIT (OUTPATIENT)
Dept: URGENT CARE | Facility: MEDICAL CENTER | Age: 54
End: 2024-12-30
Payer: COMMERCIAL

## 2024-12-30 ENCOUNTER — OFFICE VISIT (OUTPATIENT)
Dept: NEUROLOGY | Facility: CLINIC | Age: 54
End: 2024-12-30
Payer: COMMERCIAL

## 2024-12-30 VITALS
SYSTOLIC BLOOD PRESSURE: 154 MMHG | HEART RATE: 84 BPM | OXYGEN SATURATION: 95 % | DIASTOLIC BLOOD PRESSURE: 90 MMHG | RESPIRATION RATE: 18 BRPM

## 2024-12-30 VITALS
OXYGEN SATURATION: 97 % | DIASTOLIC BLOOD PRESSURE: 72 MMHG | SYSTOLIC BLOOD PRESSURE: 116 MMHG | WEIGHT: 223 LBS | HEIGHT: 70 IN | HEART RATE: 103 BPM | BODY MASS INDEX: 31.92 KG/M2 | TEMPERATURE: 98 F

## 2024-12-30 DIAGNOSIS — R56.9 SINGLE SEIZURE (HCC): Primary | ICD-10-CM

## 2024-12-30 DIAGNOSIS — J01.00 ACUTE NON-RECURRENT MAXILLARY SINUSITIS: Primary | ICD-10-CM

## 2024-12-30 DIAGNOSIS — E87.1 HYPONATREMIA: ICD-10-CM

## 2024-12-30 PROCEDURE — 99213 OFFICE O/P EST LOW 20 MIN: CPT | Performed by: FAMILY MEDICINE

## 2024-12-30 PROCEDURE — 99214 OFFICE O/P EST MOD 30 MIN: CPT | Performed by: PSYCHIATRY & NEUROLOGY

## 2024-12-30 RX ORDER — FLUTICASONE PROPIONATE 50 MCG
1 SPRAY, SUSPENSION (ML) NASAL DAILY
Qty: 9.9 ML | Refills: 0 | Status: SHIPPED | OUTPATIENT
Start: 2024-12-30

## 2024-12-30 RX ORDER — OXCARBAZEPINE 600 MG/1
600 TABLET, FILM COATED ORAL EVERY 12 HOURS SCHEDULED
Qty: 180 TABLET | Refills: 3 | Status: SHIPPED | OUTPATIENT
Start: 2024-12-30

## 2024-12-30 RX ORDER — IBUPROFEN 800 MG
TABLET ORAL
COMMUNITY

## 2024-12-30 RX ORDER — VITAMIN B COMPLEX
1 CAPSULE ORAL DAILY
COMMUNITY

## 2024-12-30 RX ORDER — AZELASTINE 1 MG/ML
1 SPRAY, METERED NASAL 2 TIMES DAILY
Qty: 30 ML | Refills: 0 | Status: SHIPPED | OUTPATIENT
Start: 2024-12-30

## 2024-12-30 NOTE — PROGRESS NOTES
[facility]        NAME: Logan Manzo is a 54 y.o. male  : 1970    MRN: [id number]  DATE: 2024  TIME: 3:11 PM    Assessment and Plan   Acute non-recurrent maxillary sinusitis [J01.00]  1. Acute non-recurrent maxillary sinusitis  fluticasone (FLONASE) 50 mcg/act nasal spray    azelastine (ASTELIN) 0.1 % nasal spray        Most likely viral  Viral URI causing PND - nasal sprays for symptomatic care  Return precautions discussed.  Discussed normal course of viral infection.      Patient Instructions       Follow up with PCP in 3-5 days.  Proceed to  ER if symptoms worsen.    If tests have been performed at [facility], our office will contact you with results if changes need to be made to the care plan discussed with you at the visit.  [facility]    Chief Complaint     Chief Complaint   Patient presents with   • Nasal Congestion     Nasal congestion, cough, and low grade fever.using afrin and taking tylenol sinus          History of Present Illness       2 days of nasal congestion  Using otc afrin and decongestant with little relief.  No fever/chills.    URI   This is a new problem. The current episode started in the past 7 days (2d). The problem has been unchanged. There has been no fever. Associated symptoms include congestion, coughing, rhinorrhea, sinus pain and sneezing. Pertinent negatives include no abdominal pain, chest pain, diarrhea, dysuria, ear pain, headaches, joint pain, joint swelling, nausea, neck pain, plugged ear sensation, rash, sore throat, swollen glands, vomiting or wheezing. He has tried nothing for the symptoms.       Review of Systems   Review of Systems   HENT:  Positive for congestion, rhinorrhea, sinus pain and sneezing. Negative for ear pain and sore throat.    Respiratory:  Positive for cough. Negative for wheezing.    Cardiovascular:  Negative for chest pain.   Gastrointestinal:  Negative for abdominal pain,  diarrhea, nausea and vomiting.   Genitourinary:  Negative for dysuria.   Musculoskeletal:  Negative for joint pain and neck pain.   Skin:  Negative for rash.   Neurological:  Negative for headaches.         Current Medications       Current Outpatient Medications:   •  azelastine (ASTELIN) 0.1 % nasal spray, 1 spray into each nostril 2 (two) times a day Use in each nostril as directed, Disp: 30 mL, Rfl: 0  •  b complex vitamins capsule, Take 1 capsule by mouth daily, Disp: , Rfl:   •  Cholecalciferol (Vitamin D3) 10 MCG (400 UNIT) CAPS, Take by mouth, Disp: , Rfl:   •  fluticasone (FLONASE) 50 mcg/act nasal spray, 1 spray into each nostril daily, Disp: 9.9 mL, Rfl: 0  •  Multiple Vitamin (MULTI VITAMIN DAILY PO), Take by mouth in the morning, Disp: , Rfl:   •  OXcarbazepine (TRILEPTAL) 600 mg tablet, Take 1 tablet (600 mg total) by mouth every 12 (twelve) hours, Disp: 180 tablet, Rfl: 3    Current Allergies     Allergies as of 12/30/2024   • (No Known Allergies)            The following portions of the patient's history were reviewed and updated as appropriate: allergies, current medications, past family history, past medical history, past social history, past surgical history and problem list.     History reviewed. No pertinent past medical history.    History reviewed. No pertinent surgical history.    Family History   Problem Relation Age of Onset   • No Known Problems Mother    • Frontotemporal dementia Father    • Alzheimer's disease Paternal Grandmother          Medications have been verified.        Objective   /90   Pulse 84   Resp 18   SpO2 95%   No LMP for male patient.       Physical Exam     Physical Exam  Vitals reviewed.   Constitutional:       General: He is not in acute distress.     Appearance: Normal appearance. He is not ill-appearing, toxic-appearing or diaphoretic.   HENT:      Head: Normocephalic and atraumatic.      Right Ear: Tympanic membrane normal.      Left Ear: Tympanic membrane  normal.      Nose: Congestion and rhinorrhea present.      Mouth/Throat:      Mouth: Mucous membranes are moist.      Pharynx: Posterior oropharyngeal erythema present.      Comments: +cobblestoning  Eyes:      General:         Right eye: No discharge.         Left eye: No discharge.   Cardiovascular:      Rate and Rhythm: Normal rate and regular rhythm.      Heart sounds: No murmur heard.  Pulmonary:      Effort: Pulmonary effort is normal. No respiratory distress.      Breath sounds: Normal breath sounds. No stridor. No wheezing, rhonchi or rales.   Chest:      Chest wall: No tenderness.   Abdominal:      General: Abdomen is flat. Bowel sounds are normal.      Palpations: Abdomen is soft.   Musculoskeletal:         General: No swelling or tenderness.      Cervical back: Normal range of motion. No tenderness.   Skin:     General: Skin is warm and dry.      Capillary Refill: Capillary refill takes less than 2 seconds.   Neurological:      General: No focal deficit present.      Mental Status: He is alert and oriented to person, place, and time.   Psychiatric:         Mood and Affect: Mood normal.         Behavior: Behavior normal.         Thought Content: Thought content normal.         Judgment: Judgment normal.

## 2024-12-30 NOTE — PROGRESS NOTES
Neurology Ambulatory Visit  Name: Logan Manzo       : 1970       MRN: [id number]   Encounter Provider: James Mooney MD   Encounter Date: 2024  [facility] [place]    Assessment and Plan  Assessment & Plan  Single seizure (HCC)  He continues to be seizure free since his initial seizure in 2014. As noted previously, he has been doing very good and has been stable without any changes to his medications in about 10 years. 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 This has mainly been because his medications have remained stable and he has not had recurrent seizures.      I continue to recommend he continue his Oxcarbazepine unchanged. We discussed that as long as he is maintaining his current licenses, it would be best to keep his medications unchanged. He is considering [facility] in the next few years and if no longer maintaining a CDL, we may consider coming off of medications at that point. Given the time period of his seizure freedom, I would support him continuing to drive and work, as he has been.      -- he will continue Oxcarbazepine 600 mg twice a day.  Hyponatremia  He has had significant hyponatremia in the past with his sodium getting down to 127 last March. His most recent Na from 2024 was 134. This is likely from Oxcarbazepine, but as long as it remains >130, I would recommend we continue his medication unchanged.     He will Return in about 6 months (around 2025).    History of Present Illness     HPI   Logan Manzo is a 54 y.o. male with single seizure in  , who is returning to Neurology office for follow up of his seizure.    Current seizure medications:  1. Oxcarbazepine 600 mg twice a day   Other medications as per [facility].    Since his last visit, he has continued to be seizure free and denies any side effects with Oxcarbazepine monotherapy. He has only had the one seizure that  "occurred in April 2014.  He has been tolerating oxcarbazepine essentially since that time without any significant side effects or problems.  He did have drug-induced hyponatremia from his oxcarbazepine with low sodium of 127 last year in March 2024, but has been getting this checked every few months.  His sodium is stabilized, typically being greater than 134.  His most recent blood work was on 12/26/2024 which was 134.    Prior Seizure Medications: none    His history was also obtained from his wife, who was present at today's visit.       Review of Systems  I have personally reviewed the MA's review of systems and made changes as necessary that was entered in a separate note    Objective   /72 (BP Location: Left arm, Patient Position: Sitting, Cuff Size: Large)   Pulse 103   Temp 98 °F (36.7 °C) (Temporal)   Ht 5' 10\" (1.778 m)   Wt 101 kg (223 lb)   SpO2 97%   BMI 32.00 kg/m²    Physical Exam  Neurologic Exam    Results/Data:  I have reviewed the results and images from the recent blood work in detail with the patient.    Voice recognition software was used in the generation of this note. There may be unintentional errors including grammatical errors, spelling errors, or pronoun errors.   "

## 2024-12-30 NOTE — LETTER
2024     To Whom This May Concern,      Logan Manzo ( 1970) is under my professional care and was seen in my office for a single unprovoked seizure, which occurred on 2014. Mr. Manzo is currently taking Oxcarbazepine 1 Tablet (600 mg total) by mouth every 12 hours and the last medication change occurred in . He has not had any additional seizures since 2014. Mr. Manzo is cleared from a neurological perspective to [place] a commercial [place]      Sincerely,        James Mooney MD  [facility]

## 2024-12-30 NOTE — PATIENT INSTRUCTIONS
-- continue to take Oxcarbazepine 600 mg twice a day     -- Please get repeat blood work in about 6 months.        Please follow-up with Dr. Mcdonald on 9/17 at 2pm. Please call the office to confirm or change the appointment as needed.

## 2024-12-31 LAB — OXCARBAZEPINE SERPL-MCNC: 13 UG/ML (ref 10–35)

## 2025-01-02 NOTE — ASSESSMENT & PLAN NOTE
He continues to be seizure free since his initial seizure in April 2014. As noted previously, he has been doing very good and has been stable without any changes to his medications in about 10 years. 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 This has mainly been because his medications have remained stable and he has not had recurrent seizures.     I continue to recommend he continue his Oxcarbazepine unchanged. We discussed that as long as he is maintaining his current licenses, it would be best to keep his medications unchanged. He is considering [facility] in the next few years and if no longer maintaining a CDL, we may consider coming off of medications at that point. Given the time period of his seizure freedom, I would support him continuing to drive and work, as he has been.     -- he will continue Oxcarbazepine 600 mg twice a day.

## 2025-01-02 NOTE — ASSESSMENT & PLAN NOTE
He has had significant hyponatremia in the past with his sodium getting down to 127 last March. His most recent Na from 12/26/2024 was 134. This is likely from Oxcarbazepine, but as long as it remains >130, I would recommend we continue his medication unchanged.

## 2025-01-02 NOTE — ASSESSMENT & PLAN NOTE
He continues to be seizure free since his initial seizure in April 2014. As noted previously, he has been doing very good and has been stable without any changes to his medications in about 10 years. 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 This has mainly been because his medications have remained stable and he has not had recurrent seizures.      I continue to recommend he continue his Oxcarbazepine unchanged. We discussed that as long as he is maintaining his current licenses, it would be best to keep his medications unchanged. He is considering [facility] in the next few years and if no longer maintaining a CDL, we may consider coming off of medications at that point. Given the time period of his seizure freedom, I would support him continuing to drive and work, as he has been.      -- he will continue Oxcarbazepine 600 mg twice a day.

## 2025-02-18 ENCOUNTER — APPOINTMENT (EMERGENCY)
Dept: CT IMAGING | Facility: HOSPITAL | Age: 55
End: 2025-02-18
Payer: COMMERCIAL

## 2025-02-18 ENCOUNTER — APPOINTMENT (EMERGENCY)
Dept: RADIOLOGY | Facility: HOSPITAL | Age: 55
End: 2025-02-18
Payer: COMMERCIAL

## 2025-02-18 ENCOUNTER — HOSPITAL ENCOUNTER (OUTPATIENT)
Facility: HOSPITAL | Age: 55
Setting detail: OBSERVATION
Discharge: HOME | End: 2025-02-19
Attending: EMERGENCY MEDICINE
Payer: COMMERCIAL

## 2025-02-18 DIAGNOSIS — R42 DIZZINESS: ICD-10-CM

## 2025-02-18 DIAGNOSIS — R11.2 NAUSEA & VOMITING: ICD-10-CM

## 2025-02-18 DIAGNOSIS — E87.1 HYPONATREMIA: Primary | ICD-10-CM

## 2025-02-18 DIAGNOSIS — E87.6 HYPOKALEMIA: ICD-10-CM

## 2025-02-18 LAB
ALBUMIN SERPL BCG-MCNC: 4.4 G/DL (ref 3.5–5)
ALP SERPL-CCNC: 60 U/L (ref 34–104)
ALT SERPL W P-5'-P-CCNC: 15 U/L (ref 7–52)
ANION GAP SERPL CALCULATED.3IONS-SCNC: 10 MMOL/L (ref 4–13)
ANION GAP SERPL CALCULATED.3IONS-SCNC: 5 MMOL/L (ref 4–13)
ANION GAP SERPL CALCULATED.3IONS-SCNC: 6 MMOL/L (ref 4–13)
ANION GAP SERPL CALCULATED.3IONS-SCNC: 6 MMOL/L (ref 4–13)
AST SERPL W P-5'-P-CCNC: 15 U/L (ref 13–39)
ATRIAL RATE: 50 BPM
BASOPHILS # BLD AUTO: 0.05 THOUSANDS/ΜL (ref 0–0.1)
BASOPHILS NFR BLD AUTO: 1 % (ref 0–1)
BILIRUB SERPL-MCNC: 1.08 MG/DL (ref 0.2–1)
BUN SERPL-MCNC: 10 MG/DL (ref 5–25)
BUN SERPL-MCNC: 11 MG/DL (ref 5–25)
BUN SERPL-MCNC: 13 MG/DL (ref 5–25)
BUN SERPL-MCNC: 9 MG/DL (ref 5–25)
CALCIUM SERPL-MCNC: 8.6 MG/DL (ref 8.4–10.2)
CALCIUM SERPL-MCNC: 8.7 MG/DL (ref 8.4–10.2)
CALCIUM SERPL-MCNC: 9 MG/DL (ref 8.4–10.2)
CALCIUM SERPL-MCNC: 9.2 MG/DL (ref 8.4–10.2)
CARDIAC TROPONIN I PNL SERPL HS: <2 NG/L (ref ?–50)
CARDIAC TROPONIN I PNL SERPL HS: <2 NG/L (ref ?–50)
CHLORIDE SERPL-SCNC: 102 MMOL/L (ref 96–108)
CHLORIDE SERPL-SCNC: 92 MMOL/L (ref 96–108)
CHLORIDE SERPL-SCNC: 96 MMOL/L (ref 96–108)
CHLORIDE SERPL-SCNC: 97 MMOL/L (ref 96–108)
CO2 SERPL-SCNC: 26 MMOL/L (ref 21–32)
CREAT SERPL-MCNC: 0.81 MG/DL (ref 0.6–1.3)
CREAT SERPL-MCNC: 0.86 MG/DL (ref 0.6–1.3)
CREAT SERPL-MCNC: 0.89 MG/DL (ref 0.6–1.3)
CREAT SERPL-MCNC: 1.1 MG/DL (ref 0.6–1.3)
EOSINOPHIL # BLD AUTO: 0.13 THOUSAND/ΜL (ref 0–0.61)
EOSINOPHIL NFR BLD AUTO: 2 % (ref 0–6)
ERYTHROCYTE [DISTWIDTH] IN BLOOD BY AUTOMATED COUNT: 12.2 % (ref 11.6–15.1)
GFR SERPL CREATININE-BSD FRML MDRD: 100 ML/MIN/1.73SQ M
GFR SERPL CREATININE-BSD FRML MDRD: 75 ML/MIN/1.73SQ M
GFR SERPL CREATININE-BSD FRML MDRD: 96 ML/MIN/1.73SQ M
GFR SERPL CREATININE-BSD FRML MDRD: 98 ML/MIN/1.73SQ M
GLUCOSE SERPL-MCNC: 112 MG/DL (ref 65–140)
GLUCOSE SERPL-MCNC: 121 MG/DL (ref 65–140)
GLUCOSE SERPL-MCNC: 160 MG/DL (ref 65–140)
GLUCOSE SERPL-MCNC: 182 MG/DL (ref 65–140)
HCT VFR BLD AUTO: 40.5 % (ref 36.5–49.3)
HGB BLD-MCNC: 14.5 G/DL (ref 12–17)
IMM GRANULOCYTES # BLD AUTO: 0.02 THOUSAND/UL (ref 0–0.2)
IMM GRANULOCYTES NFR BLD AUTO: 0 % (ref 0–2)
LYMPHOCYTES # BLD AUTO: 2.06 THOUSANDS/ΜL (ref 0.6–4.47)
LYMPHOCYTES NFR BLD AUTO: 32 % (ref 14–44)
MCH RBC QN AUTO: 31.5 PG (ref 26.8–34.3)
MCHC RBC AUTO-ENTMCNC: 35.8 G/DL (ref 31.4–37.4)
MCV RBC AUTO: 88 FL (ref 82–98)
MONOCYTES # BLD AUTO: 0.53 THOUSAND/ΜL (ref 0.17–1.22)
MONOCYTES NFR BLD AUTO: 8 % (ref 4–12)
NEUTROPHILS # BLD AUTO: 3.59 THOUSANDS/ΜL (ref 1.85–7.62)
NEUTS SEG NFR BLD AUTO: 57 % (ref 43–75)
NRBC BLD AUTO-RTO: 0 /100 WBCS
P AXIS: 42 DEGREES
PLATELET # BLD AUTO: 257 THOUSANDS/UL (ref 149–390)
PMV BLD AUTO: 9.3 FL (ref 8.9–12.7)
POTASSIUM SERPL-SCNC: 3.1 MMOL/L (ref 3.5–5.3)
POTASSIUM SERPL-SCNC: 3.7 MMOL/L (ref 3.5–5.3)
POTASSIUM SERPL-SCNC: 4 MMOL/L (ref 3.5–5.3)
POTASSIUM SERPL-SCNC: 4.4 MMOL/L (ref 3.5–5.3)
PR INTERVAL: 164 MS
PROT SERPL-MCNC: 6.7 G/DL (ref 6.4–8.4)
QRS AXIS: 63 DEGREES
QRSD INTERVAL: 110 MS
QT INTERVAL: 472 MS
QTC INTERVAL: 430 MS
RBC # BLD AUTO: 4.6 MILLION/UL (ref 3.88–5.62)
SODIUM SERPL-SCNC: 128 MMOL/L (ref 135–147)
SODIUM SERPL-SCNC: 134 MMOL/L (ref 135–147)
T WAVE AXIS: 57 DEGREES
TSH SERPL DL<=0.05 MIU/L-ACNC: 0.55 UIU/ML (ref 0.45–4.5)
VENTRICULAR RATE: 50 BPM
WBC # BLD AUTO: 6.38 THOUSAND/UL (ref 4.31–10.16)

## 2025-02-18 PROCEDURE — 84443 ASSAY THYROID STIM HORMONE: CPT

## 2025-02-18 PROCEDURE — 80048 BASIC METABOLIC PNL TOTAL CA: CPT

## 2025-02-18 PROCEDURE — 96361 HYDRATE IV INFUSION ADD-ON: CPT

## 2025-02-18 PROCEDURE — 93010 ELECTROCARDIOGRAM REPORT: CPT | Performed by: INTERNAL MEDICINE

## 2025-02-18 PROCEDURE — 71045 X-RAY EXAM CHEST 1 VIEW: CPT

## 2025-02-18 PROCEDURE — 96374 THER/PROPH/DIAG INJ IV PUSH: CPT

## 2025-02-18 PROCEDURE — 99222 1ST HOSP IP/OBS MODERATE 55: CPT

## 2025-02-18 PROCEDURE — 93005 ELECTROCARDIOGRAM TRACING: CPT

## 2025-02-18 PROCEDURE — 80053 COMPREHEN METABOLIC PANEL: CPT

## 2025-02-18 PROCEDURE — 85025 COMPLETE CBC W/AUTO DIFF WBC: CPT

## 2025-02-18 PROCEDURE — 99285 EMERGENCY DEPT VISIT HI MDM: CPT | Performed by: EMERGENCY MEDICINE

## 2025-02-18 PROCEDURE — 36415 COLL VENOUS BLD VENIPUNCTURE: CPT

## 2025-02-18 PROCEDURE — 84484 ASSAY OF TROPONIN QUANT: CPT

## 2025-02-18 PROCEDURE — 99285 EMERGENCY DEPT VISIT HI MDM: CPT

## 2025-02-18 PROCEDURE — 70450 CT HEAD/BRAIN W/O DYE: CPT

## 2025-02-18 RX ORDER — OXCARBAZEPINE 150 MG/1
600 TABLET, FILM COATED ORAL EVERY 12 HOURS SCHEDULED
Status: DISCONTINUED | OUTPATIENT
Start: 2025-02-18 | End: 2025-02-19 | Stop reason: HOSPADM

## 2025-02-18 RX ORDER — ACETAMINOPHEN 325 MG/1
650 TABLET ORAL EVERY 6 HOURS PRN
Status: DISCONTINUED | OUTPATIENT
Start: 2025-02-18 | End: 2025-02-19 | Stop reason: HOSPADM

## 2025-02-18 RX ORDER — POTASSIUM CHLORIDE 1500 MG/1
40 TABLET, EXTENDED RELEASE ORAL ONCE
Status: COMPLETED | OUTPATIENT
Start: 2025-02-18 | End: 2025-02-18

## 2025-02-18 RX ORDER — ONDANSETRON 2 MG/ML
4 INJECTION INTRAMUSCULAR; INTRAVENOUS EVERY 6 HOURS PRN
Status: DISCONTINUED | OUTPATIENT
Start: 2025-02-18 | End: 2025-02-19 | Stop reason: HOSPADM

## 2025-02-18 RX ORDER — ONDANSETRON 2 MG/ML
4 INJECTION INTRAMUSCULAR; INTRAVENOUS ONCE
Status: COMPLETED | OUTPATIENT
Start: 2025-02-18 | End: 2025-02-18

## 2025-02-18 RX ORDER — SODIUM CHLORIDE 9 MG/ML
150 INJECTION, SOLUTION INTRAVENOUS CONTINUOUS
Status: DISCONTINUED | OUTPATIENT
Start: 2025-02-18 | End: 2025-02-18

## 2025-02-18 RX ORDER — SODIUM CHLORIDE 9 MG/ML
100 INJECTION, SOLUTION INTRAVENOUS CONTINUOUS
Status: DISCONTINUED | OUTPATIENT
Start: 2025-02-18 | End: 2025-02-18

## 2025-02-18 RX ORDER — OXCARBAZEPINE 150 MG/1
600 TABLET, FILM COATED ORAL ONCE
Status: COMPLETED | OUTPATIENT
Start: 2025-02-18 | End: 2025-02-18

## 2025-02-18 RX ADMIN — SODIUM CHLORIDE 100 ML/HR: 0.9 INJECTION, SOLUTION INTRAVENOUS at 12:41

## 2025-02-18 RX ADMIN — OXCARBAZEPINE 600 MG: 150 TABLET, FILM COATED ORAL at 08:40

## 2025-02-18 RX ADMIN — OXCARBAZEPINE 600 MG: 150 TABLET, FILM COATED ORAL at 20:17

## 2025-02-18 RX ADMIN — SODIUM CHLORIDE 150 ML/HR: 0.9 INJECTION, SOLUTION INTRAVENOUS at 08:07

## 2025-02-18 RX ADMIN — SODIUM CHLORIDE 1000 ML: 0.9 INJECTION, SOLUTION INTRAVENOUS at 05:21

## 2025-02-18 RX ADMIN — POTASSIUM CHLORIDE 40 MEQ: 1500 TABLET, EXTENDED RELEASE ORAL at 08:03

## 2025-02-18 RX ADMIN — ONDANSETRON 4 MG: 2 INJECTION INTRAMUSCULAR; INTRAVENOUS at 05:31

## 2025-02-18 NOTE — PLAN OF CARE
Problem: PAIN - ADULT  Goal: Verbalizes/displays adequate comfort level or baseline comfort level  Description: Interventions:  - Encourage patient to monitor pain and request assistance  - Assess pain using appropriate pain scale  - Administer analgesics based on type and severity of pain and evaluate response  - Implement non-pharmacological measures as appropriate and evaluate response  - Consider cultural and social influences on pain and pain management  - Notify physician/advanced practitioner if interventions unsuccessful or patient reports new pain  Outcome: Progressing     Problem: INFECTION - ADULT  Goal: Absence or prevention of progression during hospitalization  Description: INTERVENTIONS:  - Assess and monitor for signs and symptoms of infection  - Monitor lab/diagnostic results  - Monitor all insertion sites, i.e. indwelling lines, tubes, and drains  - Monitor endotracheal if appropriate and nasal secretions for changes in amount and color  - [place] appropriate cooling/warming therapies per order  - Administer medications as ordered  - Instruct and encourage patient and family to use good hand hygiene technique  - Identify and instruct in appropriate isolation precautions for identified infection/condition  Outcome: Progressing     Problem: SAFETY ADULT  Goal: Patient will remain free of falls  Description: INTERVENTIONS:  - Educate patient/family on patient safety including physical limitations  - Instruct patient to call for assistance with activity   - Consult OT/PT to assist with strengthening/mobility   - Keep Call bell within reach  - Keep bed low and locked with side rails adjusted as appropriate  - Keep care items and personal belongings within reach  - Initiate and maintain comfort rounds  - Make Fall Risk Sign visible to staff  - Apply yellow socks and bracelet for high fall risk patients  - Consider moving patient to room near nurses station  Outcome: Progressing  Goal: Maintain or  return to baseline ADL function  Description: INTERVENTIONS:  -  Assess patient's ability to carry out ADLs; assess patient's baseline for ADL function and identify physical deficits which impact ability to perform ADLs (bathing, care of mouth/teeth, toileting, grooming, dressing, etc.)  - Assess/evaluate cause of self-care deficits   - Assess range of motion  - Assess patient's mobility; develop plan if impaired  - Assess patient's need for assistive devices and provide as appropriate  - Encourage maximum independence but intervene and supervise when necessary  - Involve family in performance of ADLs  - Assess for home care needs following discharge   - Consider OT consult to assist with ADL evaluation and planning for discharge  - Provide patient education as appropriate  Outcome: Progressing  Goal: Maintains/Returns to pre admission functional level  Description: INTERVENTIONS:  - Perform AM-PAC 6 Click Basic Mobility/ Daily Activity assessment daily.  - Set and communicate daily mobility goal to care team and patient/family/caregiver.   - Collaborate with rehabilitation services on mobility goals if consulted  - Out of bed for toileting  - Record patient progress and toleration of activity level   Outcome: Progressing     Problem: DISCHARGE PLANNING  Goal: Discharge to home or other facility with appropriate resources  Description: INTERVENTIONS:  - Identify barriers to discharge w/patient and caregiver  - Arrange for needed discharge resources and transportation as appropriate  - Identify discharge learning needs (meds, wound care, etc.)  - Arrange for interpretive services to assist at discharge as needed  - Refer to Case Management Department for coordinating discharge planning if the patient needs post-hospital services based on physician/advanced practitioner order or complex needs related to functional status, cognitive ability, or social support system  Outcome: Progressing     Problem: Knowledge  Deficit  Goal: Patient/family/caregiver demonstrates understanding of disease process, treatment plan, medications, and discharge instructions  Description: Complete learning assessment and assess knowledge base.  Interventions:  - Provide teaching at level of understanding  - Provide teaching via preferred learning methods  Outcome: Progressing

## 2025-02-18 NOTE — H&P
H&P - Hospitalist   Name: Logan Manzo 54 y.o. male I MRN: [id number]  Unit/Bed#: ED-29 I Date of Admission: 2/18/2025   Date of Service: 2/18/2025 I Hospital Day: 0     Assessment & Plan  Hyponatremia  Recent Labs     02/18/25  0533 02/18/25  0701   SODIUM 128* 128*     Patient completed colonoscopy prep yesterday in plan for colonoscopy today, but then began feeling dizzy, weak, with headache    S/p 1L bolus, now on maintenance fluids, feels well overall.  Baseline seems to be around 133-136    Plan:  Continue fluids for now  BMP at 12pm  Initiate diet  May need admitted for prep in the future, as this has happened previously  Single seizure (HCC)  Approx 11 years ago  Maintained on trileptal without any breakthrough seizures  Hypokalemia  Recent Labs     02/18/25  0533 02/18/25  0701   K 3.1* 3.7     Repleted and resolved      VTE Pharmacologic Prophylaxis: VTE Score: 2 Low Risk (Score 0-2) - Encourage Ambulation.  Code Status: Prior   Discussion with family: Patient declined call to .     Anticipated Length of Stay: Patient will be admitted on an observation basis with an anticipated length of stay of less than 2 midnights secondary to hyponatremia.    History of Present Illness   Chief Complaint: dizzy/weak    Logan Manzo is a 54 y.o. male with a PMH of a single seizure several years ago maintained on Trileptal, history of hyponatremia in the past who presents with dizziness, weakness.  Patient had completed his colonoscopy prep yesterday and was due to get his colonoscopy this morning, however felt very dizzy, weak, headache.  Found to be hyponatremic on admission was started on IV fluids and felt symptomatically better by the time of my evaluation.    Review of Systems   Constitutional:  Negative for chills and fever.   Respiratory:  Negative for cough and shortness of breath.    Cardiovascular:  Negative for chest pain and palpitations.   Gastrointestinal:  Negative for abdominal  pain and vomiting.   Genitourinary:  Negative for dysuria and hematuria.   Musculoskeletal:  Negative for arthralgias and back pain.   Skin:  Negative for color change and rash.   Neurological:  Negative for dizziness, seizures, syncope, weakness, light-headedness, numbness and headaches.   All other systems reviewed and are negative.      Historical Information   History reviewed. No pertinent past medical history.  History reviewed. No pertinent surgical history.  Social History     Tobacco Use    Smoking status: Never    Smokeless tobacco: Never   Vaping Use    Vaping status: Never Used   Substance and Sexual Activity    Alcohol use: Yes     Comment: Have a beer occasionally    Drug use: Never    Sexual activity: Yes     Partners: Female     Birth control/protection: Other     E-Cigarette/Vaping    E-Cigarette Use Never User      E-Cigarette/Vaping Substances    Nicotine No     THC No     CBD No     Flavoring No     Other No     Unknown No      Family history non-contributory  Social History:  Marital Status: /Civil Union     Patient Pre-hospital Living Situation: Home  Patient Pre-hospital Level of Mobility: walks  Patient Pre-hospital Diet Restrictions: none    Meds/Allergies   I have reviewed home medications with patient personally.  Prior to Admission medications    Medication Sig Start Date End Date Taking? Authorizing Provider   Multiple Vitamin (MULTI VITAMIN DAILY PO) Take by mouth in the morning   Yes Historical Provider, MD   OXcarbazepine (TRILEPTAL) 600 mg tablet Take 1 tablet (600 mg total) by mouth every 12 (twelve) hours 12/30/24  Yes James Mooney MD   fluticasone (FLONASE) 50 mcg/act nasal spray 1 spray into each nostril daily 12/30/24 2/18/25 Yes Aurea Maynard DO   azelastine (ASTELIN) 0.1 % nasal spray 1 spray into each nostril 2 (two) times a day Use in each nostril as directed  Patient not taking: Reported on 2/18/2025 12/30/24 2/18/25  Aurea Maynard DO   b complex  vitamins capsule Take 1 capsule by mouth daily  Patient not taking: Reported on 2/18/2025 2/18/25  Historical Provider, MD   Cholecalciferol (Vitamin D3) 10 MCG (400 UNIT) CAPS Take by mouth  Patient not taking: Reported on 2/18/2025 2/18/25  Historical Provider, MD     No Known Allergies    Objective :  Temp:  [97.4 °F (36.3 °C)] 97.4 °F (36.3 °C)  HR:  [53-65] 64  BP: (115-129)/(59-76) 129/76  Resp:  [16] 16  SpO2:  [93 %-98 %] 96 %  O2 Device: None (Room air)    Physical Exam  Vitals and nursing note reviewed.   Constitutional:       General: He is not in acute distress.     Appearance: He is well-developed.   HENT:      Head: Normocephalic and atraumatic.   Eyes:      Extraocular Movements: Extraocular movements intact.   Cardiovascular:      Rate and Rhythm: Normal rate and regular rhythm.      Heart sounds: No murmur heard.  Pulmonary:      Effort: Pulmonary effort is normal. No respiratory distress.      Breath sounds: Normal breath sounds. No wheezing.   Abdominal:      General: Abdomen is flat. Bowel sounds are normal. There is no distension.      Palpations: Abdomen is soft.      Tenderness: There is no abdominal tenderness.   Musculoskeletal:         General: No swelling.      Cervical back: Neck supple.      Right lower leg: No edema.      Left lower leg: No edema.   Skin:     General: Skin is warm and dry.      Capillary Refill: Capillary refill takes less than 2 seconds.   Neurological:      Mental Status: He is alert.   Psychiatric:         Mood and Affect: Mood normal.          Lines/Drains:            Lab Results: I have reviewed the following results:  Results from last 7 days   Lab Units 02/18/25  0533   WBC Thousand/uL 6.38   HEMOGLOBIN g/dL 14.5   HEMATOCRIT % 40.5   PLATELETS Thousands/uL 257   SEGS PCT % 57   LYMPHO PCT % 32   MONO PCT % 8   EOS PCT % 2     Results from last 7 days   Lab Units 02/18/25  0701 02/18/25  0533   SODIUM mmol/L 128* 128*   POTASSIUM mmol/L 3.7 3.1*   CHLORIDE  mmol/L 96 92*   CO2 mmol/L 26 26   BUN mg/dL 10 11   CREATININE mg/dL 0.81 0.89   ANION GAP mmol/L 6 10   CALCIUM mg/dL 8.6 9.0   ALBUMIN g/dL  --  4.4   TOTAL BILIRUBIN mg/dL  --  1.08*   ALK PHOS U/L  --  60   ALT U/L  --  15   AST U/L  --  15   GLUCOSE RANDOM mg/dL 112 182*             Lab Results   Component Value Date    HGBA1C 5.3 03/10/2023           Imaging Results Review: I reviewed radiology reports from this admission including: chest xray and CT head.  Other Study Results Review: EKG was reviewed.     Administrative Statements       ** Please Note: This note has been constructed using a voice recognition system. **

## 2025-02-18 NOTE — ED ATTENDING ATTESTATION
2/18/2025  I, Himanshu Cahvis MD, saw and evaluated the patient. I have discussed the patient with the resident/non-physician practitioner and agree with the resident's/non-physician practitioner's findings, Plan of Care, and MDM as documented in the resident's/non-physician practitioner's note, except where noted. All available labs and Radiology studies were reviewed.  I was present for key portions of any procedure(s) performed by the resident/non-physician practitioner and I was immediately available to provide assistance.       At this point I agree with the current assessment done in the Emergency Department.  I have conducted an independent evaluation of this patient a history and physical is as follows: Patient is a 54 year old male who is doing a colonoscopy prep for this AM and had dizziness and lightheadedness and vertigo this AM with N/V. No chest pain or sob. No travel. No fever. No headache. No difficulty hearing or tinnitus. Was last seen at [facility] in [place] on 12/30/24 for sinusitis.  [facility] website checked on this patient and no Rx found. NCAT. No scleral icterus. Mucous membranes somewhat moist. ENT exam as per ED resident. Lungs clear. Heart lauren without murmur. Abdomen soft and nontender. Good bowel sounds. No edema. No rash noted. No nystagmus. No focal deficits. DDx including but not limited to: metabolic abnormality, cardiac abnormality, thyroid disease, intracranial process, adverse reaction, dehydration. I reviewed EKG. Will check labs, CXR and CT head and give IVFs and IV zofran.     ED Course         Critical Care Time  Procedures

## 2025-02-18 NOTE — ED PROVIDER NOTES
Time reflects when diagnosis was documented in both MDM as applicable and the Disposition within this note       Time User Action Codes Description Comment    2/18/2025  9:14 AM En, Charlene Add [E87.1] Hyponatremia     2/18/2025  9:14 AM En, Charlene Add [E87.6] Hypokalemia     2/18/2025  9:14 AM En, Charlene Add [R42] Dizziness     2/18/2025  9:17 AM En, Charlene Add [R11.2] Nausea & vomiting           ED Disposition       None          Assessment & Plan       Medical Decision Making  Amount and/or Complexity of Data Reviewed  Labs: ordered. Decision-making details documented in ED Course.  Radiology: ordered and independent interpretation performed. Decision-making details documented in ED Course.    Risk  Prescription drug management.      See ED course for MDM.    ED Course as of 02/18/25 0917 Tue Feb 18, 2025   0643 .   0644 Sodium(!): 128  low   0644 Potassium(!): 3.1  low   0644 CT head without contrast  No acute intracranial abnormality.   0709 hs TnI 0hr: <2  Pending 2H trop   0743 Spoke with [facility] requesting recheck BMP. Pending dispo after.    0840 hs TnI 2hr: <2  Unchanged, low suspicion for ACS as cause of symptoms   0911 Care transferred to Dr. Urban. Pending repeat BMP for hyponatremia, dispo after.       Medications   sodium chloride 0.9 % infusion (150 mL/hr Intravenous New Bag 2/18/25 0807)   sodium chloride 0.9 % bolus 1,000 mL (0 mL Intravenous Stopped 2/18/25 0620)   ondansetron (ZOFRAN) injection 4 mg (4 mg Intravenous Given 2/18/25 0531)   potassium chloride (Klor-Con M20) CR tablet 40 mEq (40 mEq Oral Given 2/18/25 0803)   OXcarbazepine (TRILEPTAL) tablet 600 mg (600 mg Oral Given 2/18/25 0840)       ED Risk Strat Scores   HEART Risk Score      Flowsheet Row Most Recent Value   Heart Score Risk Calculator    History 1 Filed at: 02/18/2025 0914   ECG 0 Filed at: 02/18/2025 0914   Age 1 Filed at: 02/18/2025 0914   Risk Factors 1 Filed at: 02/18/2025 0914   Troponin 0 Filed at: 02/18/2025 0914    HEART Score 3 Filed at: 02/18/2025 0914          HEART Risk Score      Flowsheet Row Most Recent Value   Heart Score Risk Calculator    History 1 Filed at: 02/18/2025 0914   ECG 0 Filed at: 02/18/2025 0914   Age 1 Filed at: 02/18/2025 0914   Risk Factors 1 Filed at: 02/18/2025 0914   Troponin 0 Filed at: 02/18/2025 0914   HEART Score 3 Filed at: 02/18/2025 0914                              SBIRT 20yo+      Flowsheet Row Most Recent Value   Initial Alcohol Screen: US AUDIT-C     1. How often do you have a drink containing alcohol? 0 Filed at: 02/18/2025 0516   3a. Male UNDER 65: How often do you have five or more drinks on one occasion? 0 Filed at: 02/18/2025 0516   Audit-C Score 0 Filed at: 02/18/2025 0516   JEAN: How many times in the past year have you...    Used an illegal drug or used a prescription medication for non-medical reasons? Never Filed at: 02/18/2025 0516                            History of Present Illness       Chief Complaint   Patient presents with    Dizziness     Pt has been prepping for a colonoscopy that is today @0830. Fasting since Sunday @0000. Has been drinking water. +nausea +vomiting. +lightheadness       History reviewed. No pertinent past medical history.   History reviewed. No pertinent surgical history.   Family History   Problem Relation Age of Onset    No Known Problems Mother     Frontotemporal dementia Father     Alzheimer's disease Paternal Grandmother       Social History     Tobacco Use    Smoking status: Never    Smokeless tobacco: Never   Vaping Use    Vaping status: Never Used   Substance Use Topics    Alcohol use: Yes     Comment: Have a beer occasionally    Drug use: Never      E-Cigarette/Vaping    E-Cigarette Use Never User       E-Cigarette/Vaping Substances    Nicotine No     THC No     CBD No     Flavoring No     Other No     Unknown No       I have reviewed and agree with the history as documented.     HPI  Patient is a 54-year-old male with a history of seizures  on Trileptal, presenting for dizziness.  Patient has a routine colonoscopy scheduled for today at 8, for which she has been doing the colon cleanse.  Upon waking up this morning, 1 hour prior to arrival, patient reports lightheadedness upon standing up and movement with associated nausea/vomiting.  Patient has a history of hyponatremia with illnesses due to his Trileptal.  Denies LOC, chest pain, shortness of breath, numbness of extremities, headache, abdominal pain, or any other symptoms at this time.    Review of Systems  As per HPI      Objective       ED Triage Vitals [02/18/25 0514]   Temperature Pulse Blood Pressure Respirations SpO2 Patient Position - Orthostatic VS   (!) 97.4 °F (36.3 °C) 57 120/59 16 98 % Sitting      Temp Source Heart Rate Source BP Location FiO2 (%) Pain Score    Oral Monitor Right arm -- No Pain      Vitals      Date and Time Temp Pulse SpO2 Resp BP Pain Score FACES Pain Rating User   02/18/25 0900 -- 65 93 % 16 127/75 -- -- AG   02/18/25 0800 -- 60 97 % 16 119/76 -- -- AG   02/18/25 0700 -- 53 98 % 16 115/74 -- -- AG   02/18/25 0621 -- 60 97 % -- 115/72 -- -- JA   02/18/25 0514 97.4 °F (36.3 °C) 57 98 % 16 120/59 No Pain -- JA            Physical Exam  Vitals and nursing note reviewed.   Constitutional:       General: He is not in acute distress.     Appearance: Normal appearance. He is not ill-appearing.   HENT:      Head: Normocephalic and atraumatic.      Nose: Nose normal.   Eyes:      Extraocular Movements: Extraocular movements intact.      Conjunctiva/sclera: Conjunctivae normal.   Cardiovascular:      Rate and Rhythm: Normal rate and regular rhythm.      Pulses: Normal pulses.      Heart sounds: No murmur heard.     No friction rub. No gallop.   Pulmonary:      Effort: Pulmonary effort is normal. No respiratory distress.      Breath sounds: Normal breath sounds.   Abdominal:      General: Bowel sounds are normal.      Palpations: Abdomen is soft.      Tenderness: There is no  abdominal tenderness. There is no guarding or rebound.   Musculoskeletal:         General: No swelling or tenderness. Normal range of motion.      Cervical back: Normal range of motion. No rigidity or tenderness.   Skin:     General: Skin is warm and dry.      Capillary Refill: Capillary refill takes less than 2 seconds.   Neurological:      Mental Status: He is alert and oriented to person, place, and time.      Cranial Nerves: No cranial nerve deficit.      Sensory: No sensory deficit.      Motor: No weakness.      Comments: Patient is speaking clearly in complete sentences.  Patient is answering appropriately and able follow commands.  Patient is moving all four extremities spontaneously.  No facial droop.  Tongue midline.         Results Reviewed       Procedure Component Value Units Date/Time    Basic metabolic panel [[id number]] Collected: 02/18/25 0701    Lab Status: In process Specimen: Blood Updated: 02/18/25 0859    HS Troponin I 2hr [[id number]] Collected: 02/18/25 0738    Lab Status: Final result Specimen: Blood from Arm, Left Updated: 02/18/25 0817     hs TnI 2hr <2 ng/L      Delta 2hr hsTnI --    TSH, 3rd generation with Free T4 reflex [[id number]]  (Normal) Collected: 02/18/25 0533    Lab Status: Final result Specimen: Blood from Arm, Left Updated: 02/18/25 0701     TSH 3RD GENERATON 0.553 uIU/mL     HS Troponin 0hr (reflex protocol) [[id number]]  (Normal) Collected: 02/18/25 0533    Lab Status: Final result Specimen: Blood from Arm, Left Updated: 02/18/25 0701     hs TnI 0hr <2 ng/L     Comprehensive metabolic panel [[id number]]  (Abnormal) Collected: 02/18/25 0533    Lab Status: Final result Specimen: Blood from Arm, Left Updated: 02/18/25 0627     Sodium 128 mmol/L      Potassium 3.1 mmol/L      Chloride 92 mmol/L      CO2 26 mmol/L      ANION GAP 10 mmol/L      BUN 11 mg/dL      Creatinine 0.89 mg/dL      Glucose 182 mg/dL      Calcium 9.0 mg/dL      AST 15 U/L      ALT 15 U/L      Alkaline Phosphatase  60 U/L      Total Protein 6.7 g/dL      Albumin 4.4 g/dL      Total Bilirubin 1.08 mg/dL      eGFR 96 ml/min/1.73sq m     Narrative:      National Kid[employer] Foundation guidelines for Chronic Kidney Disease (CKD):     Stage 1 with normal or high GFR (GFR > 90 mL/min/1.73 square meters)    Stage 2 Mild CKD (GFR = 60-89 mL/min/1.73 square meters)    Stage 3A Moderate CKD (GFR = 45-59 mL/min/1.73 square meters)    Stage 3B Moderate CKD (GFR = 30-44 mL/min/1.73 square meters)    Stage 4 Severe CKD (GFR = 15-29 mL/min/1.73 square meters)    Stage 5 End Stage CKD (GFR <15 mL/min/1.73 square meters)  Note: GFR calculation is accurate only with a steady state creatinine    CBC and differential [[id number]] Collected: 02/18/25 0533    Lab Status: Final result Specimen: Blood from Arm, Left Updated: 02/18/25 0542     WBC 6.38 Thousand/uL      RBC 4.60 Million/uL      Hemoglobin 14.5 g/dL      Hematocrit 40.5 %      MCV 88 fL      MCH 31.5 pg      MCHC 35.8 g/dL      RDW 12.2 %      MPV 9.3 fL      Platelets 257 Thousands/uL      nRBC 0 /100 WBCs      Segmented % 57 %      Immature Grans % 0 %      Lymphocytes % 32 %      Monocytes % 8 %      Eosinophils Relative 2 %      Basophils Relative 1 %      Absolute Neutrophils 3.59 Thousands/µL      Absolute Immature Grans 0.02 Thousand/uL      Absolute Lymphocytes 2.06 Thousands/µL      Absolute Monocytes 0.53 Thousand/µL      Eosinophils Absolute 0.13 Thousand/µL      Basophils Absolute 0.05 Thousands/µL             XR chest 1 view portable   ED Interpretation by Charlene Gatica MD (02/18 0739)   No acute findings.  Independently interpreted by myself.      CT head without contrast   Final Interpretation by Latoya Sloan MD (02/18 0633)      No acute intracranial abnormality.                  Workstation performed: [id number]             ECG 12 Lead Documentation Only    Date/Time: 2/18/2025 5:30 AM    Performed by: Charlene Gatica MD  Authorized by: Charlene Gatica MD    ECG  reviewed by me, the ED Provider: yes    Patient location:  ED  Previous ECG:     Previous ECG:  Compared to current    Comparison ECG info:  2024    Similarity:  No change    Comparison to cardiac monitor: Yes    Quality:     Tracing quality:  Limited by artifact  Rate:     ECG rate:  50    ECG rate assessment: bradycardic    Rhythm:     Rhythm: sinus rhythm    Ectopy:     Ectopy: none    QRS:     QRS axis:  Normal    QRS intervals:  Normal  ST segments:     ST segments:  Non-specific  T waves:     T waves: non-specific        ED Medication and Procedure Management   Prior to Admission Medications   Prescriptions Last Dose Informant Patient Reported? Taking?   Cholecalciferol (Vitamin D3) 10 MCG (400 UNIT) CAPS Not Taking  Yes No   Sig: Take by mouth   Patient not taking: Reported on 2025   Multiple Vitamin (MULTI VITAMIN DAILY PO) Past Month  Yes Yes   Sig: Take by mouth in the morning   OXcarbazepine (TRILEPTAL) 600 mg tablet 2025 at  8:30 PM  No Yes   Sig: Take 1 tablet (600 mg total) by mouth every 12 (twelve) hours   azelastine (ASTELIN) 0.1 % nasal spray Not Taking  No No   Si spray into each nostril 2 (two) times a day Use in each nostril as directed   Patient not taking: Reported on 2025   b complex vitamins capsule Not Taking  Yes No   Sig: Take 1 capsule by mouth daily   Patient not taking: Reported on 2025   fluticasone (FLONASE) 50 mcg/act nasal spray 2025  No Yes   Si spray into each nostril daily      Facility-Administered Medications: None     Patient's Medications   Discharge Prescriptions    No medications on file     No discharge procedures on file.  ED SEPSIS DOCUMENTATION   Time reflects when diagnosis was documented in both MDM as applicable and the Disposition within this note       Time User Action Codes Description Comment    2025  9:14 AM Charlene Gatica [E87.1] Hyponatremia     2025  9:14 AM Charlene Gatica [E87.6] Hypokalemia     2025   9:14 AM Charlene Gatica [R42] Dizziness     2/18/2025  9:17 AM Charlene Gatica [R11.2] Nausea & vomiting                  Charlene Gatica MD  02/18/25 0918

## 2025-02-18 NOTE — ED CARE HANDOFF
Emergency Department Sign Out Note        Sign out and transfer of care from Dr Gatica. See Separate Emergency Department note.     The patient, Logan Manzo, was evaluated by the previous provider for LH and generalized discomfort found to be hyponatremic.    Workup Completed:  See ed course    ED Course / Workup Pending (followup):  Repeat BMP, admit if still hyponatremic.      HEART Risk Score      Flowsheet Row Most Recent Value   Heart Score Risk Calculator    History 1 Filed at: 02/18/2025 0914   ECG 0 Filed at: 02/18/2025 0914   Age 1 Filed at: 02/18/2025 0914   Risk Factors 1 Filed at: 02/18/2025 0914   Troponin 0 Filed at: 02/18/2025 0914   HEART Score 3 Filed at: 02/18/2025 0914                                    ED Course as of 02/20/25 1237   Tue Feb 18, 2025   0905 Hx seizures, colonoscopy prep presenting due LH, generalized pain, hyponatremic, plan for repeat BMP and admit if hyponatremic.   0907 Sodium(!): 128   0907 Potassium(!): 3.1   0908 CT head without contrast  No acute intracranial abnormality.   0908 hs TnI 0hr: <2   0908 CBC and differential  WNL   0932 Sodium(!): 128  Plan to admit for management.     Procedures  Medical Decision Making  Amount and/or Complexity of Data Reviewed  Labs: ordered. Decision-making details documented in ED Course.  Radiology: ordered and independent interpretation performed. Decision-making details documented in ED Course.    Risk  Prescription drug management.  Decision regarding hospitalization.            Disposition  Final diagnoses:   Hyponatremia   Hypokalemia   Dizziness   Nausea & vomiting     Time reflects when diagnosis was documented in both MDM as applicable and the Disposition within this note       Time User Action Codes Description Comment    2/18/2025  9:14 AM Charlene Gatica [E87.1] Hyponatremia     2/18/2025  9:14 AM Charlene Gatica [E87.6] Hypokalemia     2/18/2025  9:14 AM Charlene Gatica [R42] Dizziness     2/18/2025  9:17 AM En,  Charlene Vegas [R11.2] Nausea & vomiting           ED Disposition       ED Disposition   Admit    Condition   Stable    Date/Time   Tue Feb 18, 2025 0944    Comment                  Follow-up Information    None       Discharge Medication List as of 2/19/2025 12:14 PM        CONTINUE these medications which have NOT CHANGED    Details   Multiple Vitamin (MULTI VITAMIN DAILY PO) Take by mouth in the morning, Historical Med      OXcarbazepine (TRILEPTAL) 600 mg tablet Take 1 tablet (600 mg total) by mouth every 12 (twelve) hours, Starting Mon 12/30/2024, Normal           Outpatient Discharge Orders   Discharge Diet     Activity as tolerated          ED Provider  Electronically Signed by     Alex Urban MD  02/20/25 8841

## 2025-02-18 NOTE — ASSESSMENT & PLAN NOTE
Recent Labs     02/18/25  0533 02/18/25  0701   SODIUM 128* 128*     Patient completed colonoscopy prep yesterday in plan for colonoscopy today, but then began feeling dizzy, weak, with headache    S/p 1L bolus, now on maintenance fluids, feels well overall.  Baseline seems to be around 133-136    Plan:  Continue fluids for now  BMP at 12pm  Initiate diet  May need admitted for prep in the future, as this has happened previously

## 2025-02-18 NOTE — ED NOTES
Patient ambulating to bathroom with steady gait and no immediate signs of distress.     Bernadette Roca RN  02/18/25 2135

## 2025-02-19 VITALS
DIASTOLIC BLOOD PRESSURE: 71 MMHG | OXYGEN SATURATION: 93 % | HEART RATE: 66 BPM | RESPIRATION RATE: 17 BRPM | SYSTOLIC BLOOD PRESSURE: 117 MMHG | TEMPERATURE: 97.9 F

## 2025-02-19 PROBLEM — E87.6 HYPOKALEMIA: Status: RESOLVED | Noted: 2025-02-18 | Resolved: 2025-02-19

## 2025-02-19 LAB
ANION GAP SERPL CALCULATED.3IONS-SCNC: 5 MMOL/L (ref 4–13)
ANION GAP SERPL CALCULATED.3IONS-SCNC: 7 MMOL/L (ref 4–13)
BASOPHILS # BLD AUTO: 0.06 THOUSANDS/ΜL (ref 0–0.1)
BASOPHILS NFR BLD AUTO: 1 % (ref 0–1)
BUN SERPL-MCNC: 12 MG/DL (ref 5–25)
BUN SERPL-MCNC: 9 MG/DL (ref 5–25)
CALCIUM SERPL-MCNC: 8.8 MG/DL (ref 8.4–10.2)
CALCIUM SERPL-MCNC: 9 MG/DL (ref 8.4–10.2)
CHLORIDE SERPL-SCNC: 102 MMOL/L (ref 96–108)
CHLORIDE SERPL-SCNC: 99 MMOL/L (ref 96–108)
CO2 SERPL-SCNC: 25 MMOL/L (ref 21–32)
CO2 SERPL-SCNC: 28 MMOL/L (ref 21–32)
CREAT SERPL-MCNC: 0.97 MG/DL (ref 0.6–1.3)
CREAT SERPL-MCNC: 1 MG/DL (ref 0.6–1.3)
EOSINOPHIL # BLD AUTO: 0.17 THOUSAND/ΜL (ref 0–0.61)
EOSINOPHIL NFR BLD AUTO: 2 % (ref 0–6)
ERYTHROCYTE [DISTWIDTH] IN BLOOD BY AUTOMATED COUNT: 12.6 % (ref 11.6–15.1)
GFR SERPL CREATININE-BSD FRML MDRD: 84 ML/MIN/1.73SQ M
GFR SERPL CREATININE-BSD FRML MDRD: 88 ML/MIN/1.73SQ M
GLUCOSE SERPL-MCNC: 105 MG/DL (ref 65–140)
GLUCOSE SERPL-MCNC: 85 MG/DL (ref 65–140)
HCT VFR BLD AUTO: 38.4 % (ref 36.5–49.3)
HGB BLD-MCNC: 13.2 G/DL (ref 12–17)
IMM GRANULOCYTES # BLD AUTO: 0.02 THOUSAND/UL (ref 0–0.2)
IMM GRANULOCYTES NFR BLD AUTO: 0 % (ref 0–2)
LYMPHOCYTES # BLD AUTO: 2.22 THOUSANDS/ΜL (ref 0.6–4.47)
LYMPHOCYTES NFR BLD AUTO: 29 % (ref 14–44)
MAGNESIUM SERPL-MCNC: 2.1 MG/DL (ref 1.9–2.7)
MCH RBC QN AUTO: 31.4 PG (ref 26.8–34.3)
MCHC RBC AUTO-ENTMCNC: 34.4 G/DL (ref 31.4–37.4)
MCV RBC AUTO: 91 FL (ref 82–98)
MONOCYTES # BLD AUTO: 0.76 THOUSAND/ΜL (ref 0.17–1.22)
MONOCYTES NFR BLD AUTO: 10 % (ref 4–12)
NEUTROPHILS # BLD AUTO: 4.5 THOUSANDS/ΜL (ref 1.85–7.62)
NEUTS SEG NFR BLD AUTO: 58 % (ref 43–75)
NRBC BLD AUTO-RTO: 0 /100 WBCS
PLATELET # BLD AUTO: 238 THOUSANDS/UL (ref 149–390)
PMV BLD AUTO: 9 FL (ref 8.9–12.7)
POTASSIUM SERPL-SCNC: 3.9 MMOL/L (ref 3.5–5.3)
POTASSIUM SERPL-SCNC: 4.4 MMOL/L (ref 3.5–5.3)
RBC # BLD AUTO: 4.2 MILLION/UL (ref 3.88–5.62)
SODIUM SERPL-SCNC: 132 MMOL/L (ref 135–147)
SODIUM SERPL-SCNC: 134 MMOL/L (ref 135–147)
WBC # BLD AUTO: 7.73 THOUSAND/UL (ref 4.31–10.16)

## 2025-02-19 PROCEDURE — 83735 ASSAY OF MAGNESIUM: CPT

## 2025-02-19 PROCEDURE — 80048 BASIC METABOLIC PNL TOTAL CA: CPT

## 2025-02-19 PROCEDURE — 99239 HOSP IP/OBS DSCHRG MGMT >30: CPT | Performed by: PHYSICIAN ASSISTANT

## 2025-02-19 PROCEDURE — 85025 COMPLETE CBC W/AUTO DIFF WBC: CPT

## 2025-02-19 RX ADMIN — OXCARBAZEPINE 600 MG: 150 TABLET, FILM COATED ORAL at 08:28

## 2025-02-19 NOTE — ASSESSMENT & PLAN NOTE
Approx 11 years ago  Maintained on trileptal without any breakthrough seizures  Follow up Dr. Mooney

## 2025-02-19 NOTE — ASSESSMENT & PLAN NOTE
Recent Labs     02/18/25  1950 02/19/25  0343 02/19/25  1133   K 4.4 3.9 4.4   MG  --  2.1  --      Repleted and resolved

## 2025-02-19 NOTE — DISCHARGE SUMMARY
Discharge Summary - Hospitalist   Name: Logan Manzo 54 y.o. male I MRN: [id number]  Unit/Bed#: -01 I Date of Admission: 2/18/2025   Date of Service: 2/19/2025 I Hospital Day: 0     Assessment & Plan  Hyponatremia  Recent Labs     02/18/25 1950 02/19/25  0343 02/19/25  1133   SODIUM 134* 134* 132*     Patient completed colonoscopy prep yesterday in plan for colonoscopy today, but then began feeling dizzy, weak, with headache  S/p 1L bolus, now on maintenance fluids, feels well overall.  Baseline seems to be around 133-136 and he tells me his neurologist says that 132-135 is acceptable (Trileptal can cause hyponatremia)  Stable for discharge   Continue Trileptal   Encourage oral intake   Single seizure (HCC)  Approx 11 years ago  Maintained on trileptal without any breakthrough seizures  Follow up Dr. Mooney   Hypokalemia (Resolved: 2/19/2025)  Recent Labs     02/18/25 1950 02/19/25  0343 02/19/25  1133   K 4.4 3.9 4.4   MG  --  2.1  --      Repleted and resolved     Medical Problems       Resolved Problems  Date Reviewed: 2/19/2025          Resolved    Hypokalemia 2/19/2025     Resolved by  Walter Zelaya PA-C        Discharging Physician / Practitioner: Walter Zelaya PA-C  PCP: Millicent Peraza PA-C  Admission Date:   Admission Orders (From admission, onward)       Ordered        02/18/25 0945  Place in Observation  Once                          Discharge Date: 02/19/25    Consultations During Hospital Stay:  None    Procedures Performed:   CXR  CT Head    Significant Findings / Test Results:   CXR: No acute pulmonary disease   CT Head: No acute intracranial abnormality      Incidental Findings:   None     Test Results Pending at Discharge (will require follow up):   None     Outpatient Tests Requested:  None    Complications:  None    Reason for Admission: Dizziness, Hyponatremia    Hospital Course:   Logan Manzo is a 54 y.o. male patient who originally presented to the hospital  on 2/18/2025 due to dizziness. Imaging in the ED was negative. He was found to be hyponatremic at 128. He was admitted and started on IVF. Potassium was low as well and this was repleted. With fluids and eating his sodium increased to 132 which per his Neurologist (he is on Trileptal which can cause hyponatremia) anywhere from 132 to 135 is acceptable. He is asymptomatic. He will discharge home, continue Trileptal and was encouraged to continue oral intake. He may need to consider inpatient preps for colonoscopy in the future.     Hospital Course: No notes on file      Please see above list of diagnoses and related plan for additional information.     Condition at Discharge: stable    Discharge Day Visit / Exam:   Subjective:  Patient reports feeling much better. Eating and drinking well. Denies complaints. Wants to go home.   Vitals: Blood Pressure: 117/71 (02/19/25 0626)  Pulse: 66 (02/19/25 0626)  Temperature: 97.9 °F (36.6 °C) (02/19/25 0626)  Temp Source: Oral (02/19/25 0626)  Respirations: 17 (02/18/25 2300)  SpO2: 93 % (02/19/25 0626)  Physical Exam  Vitals and nursing note reviewed.   Constitutional:       General: He is not in acute distress.     Appearance: Normal appearance. He is normal weight. He is not ill-appearing or diaphoretic.   HENT:      Head: Normocephalic and atraumatic.      Mouth/Throat:      Mouth: Mucous membranes are moist.   Eyes:      General: No scleral icterus.     Pupils: Pupils are equal, round, and reactive to light.   Cardiovascular:      Rate and Rhythm: Normal rate and regular rhythm.      Pulses: Normal pulses.      Heart sounds: Normal heart sounds, S1 normal and S2 normal. No murmur heard.     No systolic murmur is present.      No diastolic murmur is present.      No gallop. No S3 or S4 sounds.   Pulmonary:      Effort: Pulmonary effort is normal. No accessory muscle usage or respiratory distress.      Breath sounds: Normal breath sounds. No stridor. No decreased breath  sounds, wheezing, rhonchi or rales.   Chest:      Chest wall: No tenderness.   Abdominal:      General: Bowel sounds are normal. There is no distension.      Palpations: Abdomen is soft.      Tenderness: There is no abdominal tenderness. There is no guarding.   Musculoskeletal:      Right lower leg: No edema.      Left lower leg: No edema.   Skin:     General: Skin is warm and dry.      Coloration: Skin is not jaundiced.   Neurological:      General: No focal deficit present.      Mental Status: He is alert. Mental status is at baseline.      Motor: No tremor or seizure activity.   Psychiatric:         Behavior: Behavior is cooperative.          Discussion with Family: Patient declined call to .     Discharge instructions/Information to patient and family:   See after visit summary for information provided to patient and family.      Provisions for Follow-Up Care:  See after visit summary for information related to follow-up care and any pertinent home health orders.      Mobility at time of Discharge:   Basic Mobility Inpatient Raw Score: 24  JH-HLM Goal: 8: Walk 250 feet or more  JH-HLM Achieved: 8: Walk 250 feet ot more  HLM Goal achieved. Continue to encourage appropriate mobility.     Disposition:   Home    Planned Readmission: None    Discharge Medications:  See after visit summary for reconciled discharge medications provided to patient and/or family.      Administrative Statements   Discharge Statement:  I have spent a total time of 45 minutes in caring for this patient on the day of the visit/encounter. >30 minutes of time was spent on: Diagnostic results, Instructions for management, Impressions, Counseling / Coordination of care, Documenting in the medical record, Reviewing / ordering tests, medicine, procedures  , and Communicating with other healthcare professionals .    **Please Note: This note may have been constructed using a voice recognition system**

## 2025-02-19 NOTE — ASSESSMENT & PLAN NOTE
Recent Labs     02/18/25  1950 02/19/25  0343 02/19/25  1133   SODIUM 134* 134* 132*     Patient completed colonoscopy prep yesterday in plan for colonoscopy today, but then began feeling dizzy, weak, with headache  S/p 1L bolus, now on maintenance fluids, feels well overall.  Baseline seems to be around 133-136 and he tells me his neurologist says that 132-135 is acceptable (Trileptal can cause hyponatremia)  Stable for discharge   Continue Trileptal   Encourage oral intake

## 2025-02-24 ENCOUNTER — APPOINTMENT (OUTPATIENT)
Dept: LAB | Facility: MEDICAL CENTER | Age: 55
End: 2025-02-24
Payer: COMMERCIAL

## 2025-02-24 DIAGNOSIS — R56.9 SINGLE SEIZURE (HCC): ICD-10-CM

## 2025-02-24 DIAGNOSIS — E87.1 HYPONATREMIA: ICD-10-CM

## 2025-02-24 LAB
ANION GAP SERPL CALCULATED.3IONS-SCNC: 8 MMOL/L (ref 4–13)
BUN SERPL-MCNC: 14 MG/DL (ref 5–25)
CALCIUM SERPL-MCNC: 9.7 MG/DL (ref 8.4–10.2)
CHLORIDE SERPL-SCNC: 98 MMOL/L (ref 96–108)
CO2 SERPL-SCNC: 28 MMOL/L (ref 21–32)
CREAT SERPL-MCNC: 1 MG/DL (ref 0.6–1.3)
GFR SERPL CREATININE-BSD FRML MDRD: 84 ML/MIN/1.73SQ M
GLUCOSE P FAST SERPL-MCNC: 87 MG/DL (ref 65–99)
POTASSIUM SERPL-SCNC: 4.4 MMOL/L (ref 3.5–5.3)
SODIUM SERPL-SCNC: 134 MMOL/L (ref 135–147)

## 2025-02-24 PROCEDURE — 36415 COLL VENOUS BLD VENIPUNCTURE: CPT

## 2025-02-24 PROCEDURE — 80048 BASIC METABOLIC PNL TOTAL CA: CPT

## 2025-05-08 ENCOUNTER — TELEPHONE (OUTPATIENT)
Dept: CARDIOLOGY CLINIC | Facility: CLINIC | Age: 55
End: 2025-05-08

## 2025-05-08 NOTE — TELEPHONE ENCOUNTER
Pt needs a echocardiogram    Referral placed on 24    Referral will  on 25 as stated in workq    Called and lvm for pt to reach out to central scheduling at [phone] to schedule the echo

## 2025-06-23 ENCOUNTER — TELEPHONE (OUTPATIENT)
Dept: NEUROLOGY | Facility: CLINIC | Age: 55
End: 2025-06-23

## 2025-06-24 ENCOUNTER — TELEPHONE (OUTPATIENT)
Age: 55
End: 2025-06-24

## 2025-06-24 DIAGNOSIS — R56.9 SINGLE SEIZURE (HCC): Primary | ICD-10-CM

## 2025-06-24 DIAGNOSIS — E87.1 HYPONATREMIA: ICD-10-CM

## 2025-06-24 NOTE — TELEPHONE ENCOUNTER
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    Wife informed provider typically needs to draw labs to check levels of meds for pt prior to completing forms, but none are entered as she checked with the lab.     Requesting provider to send them a my chart msg once provider  placed lab orders, so pt can get them done prior to appt on 7/7/25.

## 2025-06-26 NOTE — TELEPHONE ENCOUNTER
Called to make aware that labs have been ordered and spoke with the pt, who verbalized an understanding.

## 2025-06-27 ENCOUNTER — APPOINTMENT (OUTPATIENT)
Dept: LAB | Facility: MEDICAL CENTER | Age: 55
End: 2025-06-27
Payer: COMMERCIAL

## 2025-06-27 DIAGNOSIS — R56.9 SINGLE SEIZURE (HCC): ICD-10-CM

## 2025-06-27 DIAGNOSIS — E87.1 HYPONATREMIA: ICD-10-CM

## 2025-06-27 LAB
ALBUMIN SERPL BCG-MCNC: 4.6 G/DL (ref 3.5–5)
ALP SERPL-CCNC: 78 U/L (ref 34–104)
ALT SERPL W P-5'-P-CCNC: 18 U/L (ref 7–52)
ANION GAP SERPL CALCULATED.3IONS-SCNC: 7 MMOL/L (ref 4–13)
AST SERPL W P-5'-P-CCNC: 19 U/L (ref 13–39)
BILIRUB SERPL-MCNC: 0.84 MG/DL (ref 0.2–1)
BUN SERPL-MCNC: 12 MG/DL (ref 5–25)
CALCIUM SERPL-MCNC: 9.1 MG/DL (ref 8.4–10.2)
CHLORIDE SERPL-SCNC: 97 MMOL/L (ref 96–108)
CO2 SERPL-SCNC: 28 MMOL/L (ref 21–32)
CREAT SERPL-MCNC: 0.85 MG/DL (ref 0.6–1.3)
ERYTHROCYTE [DISTWIDTH] IN BLOOD BY AUTOMATED COUNT: 12.5 % (ref 11.6–15.1)
GFR SERPL CREATININE-BSD FRML MDRD: 98 ML/MIN/1.73SQ M
GLUCOSE P FAST SERPL-MCNC: 90 MG/DL (ref 65–99)
HCT VFR BLD AUTO: 42.3 % (ref 36.5–49.3)
HGB BLD-MCNC: 14.5 G/DL (ref 12–17)
MCH RBC QN AUTO: 31.4 PG (ref 26.8–34.3)
MCHC RBC AUTO-ENTMCNC: 34.3 G/DL (ref 31.4–37.4)
MCV RBC AUTO: 92 FL (ref 82–98)
PLATELET # BLD AUTO: 268 THOUSANDS/UL (ref 149–390)
PMV BLD AUTO: 10 FL (ref 8.9–12.7)
POTASSIUM SERPL-SCNC: 4.1 MMOL/L (ref 3.5–5.3)
PROT SERPL-MCNC: 6.8 G/DL (ref 6.4–8.4)
RBC # BLD AUTO: 4.62 MILLION/UL (ref 3.88–5.62)
SODIUM SERPL-SCNC: 132 MMOL/L (ref 135–147)
WBC # BLD AUTO: 7.19 THOUSAND/UL (ref 4.31–10.16)

## 2025-06-27 PROCEDURE — 80053 COMPREHEN METABOLIC PANEL: CPT

## 2025-06-27 PROCEDURE — 85027 COMPLETE CBC AUTOMATED: CPT

## 2025-06-27 PROCEDURE — 36415 COLL VENOUS BLD VENIPUNCTURE: CPT

## 2025-06-27 PROCEDURE — 80183 DRUG SCRN QUANT OXCARBAZEPIN: CPT

## 2025-06-30 NOTE — TELEPHONE ENCOUNTER
pt called and states that he had blood work done on friday and he sees results but does not see oxcarbazepine results. He wanted to make sure that this was drawn as he needs this result for appt next week.     Per chart, oxcarbazepine is in process.  Made him aware of this and that this lab can take a few day to result.    No further questions

## 2025-07-01 LAB — OXCARBAZEPINE SERPL-MCNC: 15 UG/ML (ref 10–35)

## 2025-07-07 ENCOUNTER — OFFICE VISIT (OUTPATIENT)
Dept: NEUROLOGY | Facility: CLINIC | Age: 55
End: 2025-07-07
Payer: COMMERCIAL

## 2025-07-07 VITALS
HEART RATE: 71 BPM | TEMPERATURE: 97.8 F | SYSTOLIC BLOOD PRESSURE: 142 MMHG | BODY MASS INDEX: 32.9 KG/M2 | HEIGHT: 70 IN | DIASTOLIC BLOOD PRESSURE: 82 MMHG | WEIGHT: 229.8 LBS

## 2025-07-07 DIAGNOSIS — R56.9 SINGLE SEIZURE (HCC): Primary | ICD-10-CM

## 2025-07-07 DIAGNOSIS — E87.1 HYPONATREMIA: ICD-10-CM

## 2025-07-07 PROCEDURE — 99214 OFFICE O/P EST MOD 30 MIN: CPT | Performed by: PSYCHIATRY & NEUROLOGY

## 2025-07-07 RX ORDER — OXCARBAZEPINE 600 MG/1
600 TABLET, FILM COATED ORAL EVERY 12 HOURS SCHEDULED
Qty: 180 TABLET | Refills: 3 | Status: SHIPPED | OUTPATIENT
Start: 2025-07-07

## 2025-07-07 NOTE — PROGRESS NOTES
Name: Logan Manzo      : 1970      MRN: [id number]  Encounter Provider: James Mooney MD  Encounter Date: 2025   Encounter department: NEUROLOGY [facility]  :  Assessment & Plan  Single seizure (HCC)  As noted previously, he continues to be seizure-free since 2014, without any other issues or problems since that time.  He has been maintained on oxcarbazepine and has been strictly compliant.  This medication has caused some mild hyponatremia, which is expected with this medication to some degree, but has overall been stable, as noted below.    Because he has been doing well with medications for a very long time, I will not make any changes to his medications.  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    --He will continue oxcarbazepine 600 mg twice a day.    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  This has been stable.  As long as his sodium remains greater than 130, I would not recommend any changes.  Although he did have a lower sodium in February, this was due to dehydration from prep for colonoscopy  Orders:    Comprehensive metabolic panel; Future        He will Return in about 8 months (around 3/7/2026).      History of Present Illness   HPI  Logan Manzo is a 54 y.o. male with single seizure in  , who is returning to Neurology office for follow up of his seizure.     Current seizure medications:  1. Oxcarbazepine 600 mg twice a day   Other medications as per Epic.    Since his last visit, he continues to be  "seizure-free.  In February he was in the hospital because of some dizziness and was found to have more prominent hyponatremia with a sodium as low as 128.  He was given some IV fluids, and this resolved.  This occurred in the setting of a GI prep for colonoscopy.    As before, he continues to be seizure-free since April 2014, now with his last seizure being over 11 years ago.    He did get recent blood work on 6/27/2025.  His oxcarbazepine level was stable at 15, CMP was unremarkable other than a sodium level of 132, which has been stable, and CBC was also normal    Prior Seizure Medications: none    His history was also obtained from his wife, who was present at today's visit.     Review of Systems  I personally reviewed the review of systems that was entered by the medical assistant in a separate note       Objective   /82 (BP Location: Left arm, Patient Position: Sitting, Cuff Size: Standard)   Pulse 71   Temp 97.8 °F (36.6 °C) (Temporal)   Ht 5' 10\" (1.778 m)   Wt 104 kg (229 lb 12.8 oz)   BMI 32.97 kg/m²      Physical Exam    Voice recognition software was used in the generation of this note. There may be unintentional errors including grammatical errors, spelling errors, or pronoun errors.  :  "

## 2025-07-07 NOTE — PATIENT INSTRUCTIONS
-- continue to take Oxcarbazepine unchanged.     -- Please get blood work rechecked around December 2025

## 2025-07-07 NOTE — LETTER
2025     To Whom This May Concern,      Logan Manzo ( 1970) is under my professional care and was seen in my office for a single unprovoked seizure, which occurred on 2014. Mr. Manzo continues to take Oxcarbazepine 1 Tablet (600 mg total) by mouth every 12 hours and the last medication change occurred in . He has not had any additional seizures since 2014. 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      Sincerely,        James Mooney MD  [facility]

## 2025-07-07 NOTE — ASSESSMENT & PLAN NOTE
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  This has been stable.  As long as his sodium remains greater than 130, I would not recommend any changes.  Although he did have a lower sodium in February, this was due to dehydration from prep for colonoscopy  Orders:    Comprehensive metabolic panel; Future

## 2025-07-07 NOTE — ASSESSMENT & PLAN NOTE
As noted previously, he continues to be seizure-free since April 2014, without any other issues or problems since that time.  He has been maintained on oxcarbazepine and has been strictly compliant.  This medication has caused some mild hyponatremia, which is expected with this medication to some degree, but has overall been stable, as noted below.    Because he has been doing well with medications for a very long time, I will not make any changes to his medications.  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    --He will continue oxcarbazepine 600 mg twice a day.    --Will continue to recheck blood work, as below, expecting to have this done in about 6 months  Orders:    CBC; Future    Comprehensive metabolic panel; Future    Oxcarbazepine level; Future    OXcarbazepine (TRILEPTAL) 600 mg tablet; Take 1 tablet (600 mg total) by mouth every 12 (twelve) hours